# Patient Record
Sex: MALE | Race: WHITE | NOT HISPANIC OR LATINO | Employment: FULL TIME | URBAN - NONMETROPOLITAN AREA
[De-identification: names, ages, dates, MRNs, and addresses within clinical notes are randomized per-mention and may not be internally consistent; named-entity substitution may affect disease eponyms.]

---

## 2019-10-01 ENCOUNTER — HOSPITAL ENCOUNTER (EMERGENCY)
Facility: HOSPITAL | Age: 39
Discharge: STILL ADMITTED OR ADMINISTRATIVE | End: 2019-10-01
Attending: EMERGENCY MEDICINE

## 2019-10-01 ENCOUNTER — HOSPITAL ENCOUNTER (INPATIENT)
Facility: HOSPITAL | Age: 39
LOS: 6 days | Discharge: HOME | End: 2019-10-07
Attending: PSYCHIATRY & NEUROLOGY | Admitting: PSYCHIATRY & NEUROLOGY

## 2019-10-01 VITALS
HEIGHT: 67 IN | HEART RATE: 72 BPM | RESPIRATION RATE: 18 BRPM | DIASTOLIC BLOOD PRESSURE: 88 MMHG | WEIGHT: 160 LBS | OXYGEN SATURATION: 98 % | TEMPERATURE: 99.5 F | BODY MASS INDEX: 25.11 KG/M2 | SYSTOLIC BLOOD PRESSURE: 145 MMHG

## 2019-10-01 DIAGNOSIS — F10.29 ALCOHOL DEPENDENCE WITH UNSPECIFIED ALCOHOL-INDUCED DISORDER (HCC): ICD-10-CM

## 2019-10-01 DIAGNOSIS — F19.10 POLYSUBSTANCE ABUSE (HCC): Primary | ICD-10-CM

## 2019-10-01 LAB
6-ACETYL MORPHINE: NEGATIVE
ALBUMIN SERPL-MCNC: 4.9 G/DL (ref 3.5–5.2)
ALBUMIN/GLOB SERPL: 1.6 G/DL
ALP SERPL-CCNC: 92 U/L (ref 39–117)
ALT SERPL W P-5'-P-CCNC: 30 U/L (ref 1–41)
AMPHET+METHAMPHET UR QL: NEGATIVE
ANION GAP SERPL CALCULATED.3IONS-SCNC: 12 MMOL/L (ref 5–15)
AST SERPL-CCNC: 30 U/L (ref 1–40)
BARBITURATES UR QL SCN: NEGATIVE
BASOPHILS # BLD AUTO: 0.01 10*3/MM3 (ref 0–0.2)
BASOPHILS NFR BLD AUTO: 0.2 % (ref 0–1.5)
BENZODIAZ UR QL SCN: NEGATIVE
BILIRUB SERPL-MCNC: 0.8 MG/DL (ref 0.2–1.2)
BILIRUB UR QL STRIP: NEGATIVE
BUN BLD-MCNC: 18 MG/DL (ref 6–20)
BUN/CREAT SERPL: 15 (ref 7–25)
BUPRENORPHINE SERPL-MCNC: NEGATIVE NG/ML
CALCIUM SPEC-SCNC: 9.3 MG/DL (ref 8.6–10.5)
CANNABINOIDS SERPL QL: NEGATIVE
CHLORIDE SERPL-SCNC: 100 MMOL/L (ref 98–107)
CLARITY UR: ABNORMAL
CO2 SERPL-SCNC: 26 MMOL/L (ref 22–29)
COCAINE UR QL: NEGATIVE
COLOR UR: YELLOW
CREAT BLD-MCNC: 1.2 MG/DL (ref 0.76–1.27)
DEPRECATED RDW RBC AUTO: 43.6 FL (ref 37–54)
EOSINOPHIL # BLD AUTO: 0.2 10*3/MM3 (ref 0–0.4)
EOSINOPHIL NFR BLD AUTO: 3.4 % (ref 0.3–6.2)
ERYTHROCYTE [DISTWIDTH] IN BLOOD BY AUTOMATED COUNT: 13.1 % (ref 12.3–15.4)
ETHANOL BLD-MCNC: <10 MG/DL (ref 0–10)
ETHANOL UR QL: <0.01 %
GFR SERPL CREATININE-BSD FRML MDRD: 67 ML/MIN/1.73
GLOBULIN UR ELPH-MCNC: 3 GM/DL
GLUCOSE BLD-MCNC: 91 MG/DL (ref 65–99)
GLUCOSE UR STRIP-MCNC: NEGATIVE MG/DL
HCT VFR BLD AUTO: 44.3 % (ref 37.5–51)
HGB BLD-MCNC: 14.9 G/DL (ref 13–17.7)
HGB UR QL STRIP.AUTO: NEGATIVE
IMM GRANULOCYTES # BLD AUTO: 0.02 10*3/MM3 (ref 0–0.05)
IMM GRANULOCYTES NFR BLD AUTO: 0.3 % (ref 0–0.5)
KETONES UR QL STRIP: NEGATIVE
LEUKOCYTE ESTERASE UR QL STRIP.AUTO: NEGATIVE
LYMPHOCYTES # BLD AUTO: 1.67 10*3/MM3 (ref 0.7–3.1)
LYMPHOCYTES NFR BLD AUTO: 28.7 % (ref 19.6–45.3)
MAGNESIUM SERPL-MCNC: 2.3 MG/DL (ref 1.6–2.6)
MCH RBC QN AUTO: 31.6 PG (ref 26.6–33)
MCHC RBC AUTO-ENTMCNC: 33.6 G/DL (ref 31.5–35.7)
MCV RBC AUTO: 94.1 FL (ref 79–97)
METHADONE UR QL SCN: NEGATIVE
MONOCYTES # BLD AUTO: 0.65 10*3/MM3 (ref 0.1–0.9)
MONOCYTES NFR BLD AUTO: 11.2 % (ref 5–12)
NEUTROPHILS # BLD AUTO: 3.27 10*3/MM3 (ref 1.7–7)
NEUTROPHILS NFR BLD AUTO: 56.2 % (ref 42.7–76)
NITRITE UR QL STRIP: NEGATIVE
OPIATES UR QL: POSITIVE
OXYCODONE UR QL SCN: NEGATIVE
PCP UR QL SCN: NEGATIVE
PH UR STRIP.AUTO: 8 [PH] (ref 5–8)
PLATELET # BLD AUTO: 202 10*3/MM3 (ref 140–450)
PMV BLD AUTO: 9.3 FL (ref 6–12)
POTASSIUM BLD-SCNC: 3.8 MMOL/L (ref 3.5–5.2)
PROT SERPL-MCNC: 7.9 G/DL (ref 6–8.5)
PROT UR QL STRIP: ABNORMAL
RBC # BLD AUTO: 4.71 10*6/MM3 (ref 4.14–5.8)
SODIUM BLD-SCNC: 138 MMOL/L (ref 136–145)
SP GR UR STRIP: 1.02 (ref 1–1.03)
UROBILINOGEN UR QL STRIP: ABNORMAL
WBC NRBC COR # BLD: 5.82 10*3/MM3 (ref 3.4–10.8)

## 2019-10-01 PROCEDURE — 85025 COMPLETE CBC W/AUTO DIFF WBC: CPT | Performed by: NURSE PRACTITIONER

## 2019-10-01 PROCEDURE — 80307 DRUG TEST PRSMV CHEM ANLYZR: CPT | Performed by: NURSE PRACTITIONER

## 2019-10-01 PROCEDURE — 99285 EMERGENCY DEPT VISIT HI MDM: CPT

## 2019-10-01 PROCEDURE — 83735 ASSAY OF MAGNESIUM: CPT | Performed by: NURSE PRACTITIONER

## 2019-10-01 PROCEDURE — 81003 URINALYSIS AUTO W/O SCOPE: CPT | Performed by: NURSE PRACTITIONER

## 2019-10-01 PROCEDURE — 99284 EMERGENCY DEPT VISIT MOD MDM: CPT

## 2019-10-01 PROCEDURE — HZ2ZZZZ DETOXIFICATION SERVICES FOR SUBSTANCE ABUSE TREATMENT: ICD-10-PCS | Performed by: PSYCHIATRY & NEUROLOGY

## 2019-10-01 PROCEDURE — 80053 COMPREHEN METABOLIC PANEL: CPT | Performed by: NURSE PRACTITIONER

## 2019-10-01 PROCEDURE — 36415 COLL VENOUS BLD VENIPUNCTURE: CPT

## 2019-10-01 RX ORDER — LORAZEPAM 2 MG/1
2 TABLET ORAL ONCE
Status: COMPLETED | OUTPATIENT
Start: 2019-10-01 | End: 2019-10-01

## 2019-10-01 RX ADMIN — LORAZEPAM 2 MG: 2 TABLET ORAL at 22:51

## 2019-10-02 PROBLEM — F11.10: Status: ACTIVE | Noted: 2019-10-02

## 2019-10-02 PROBLEM — F10.239 ALCOHOL DEPENDENCE WITH WITHDRAWAL (HCC): Status: ACTIVE | Noted: 2019-10-02

## 2019-10-02 PROBLEM — F10.20 ALCOHOL DEPENDENCE (HCC): Status: ACTIVE | Noted: 2019-10-02

## 2019-10-02 PROBLEM — F17.200 TOBACCO USE DISORDER: Status: ACTIVE | Noted: 2019-10-02

## 2019-10-02 PROCEDURE — 99222 1ST HOSP IP/OBS MODERATE 55: CPT | Performed by: PSYCHIATRY & NEUROLOGY

## 2019-10-02 PROCEDURE — 25010000002 INFLUENZA VAC SUBUNIT QUAD 0.5 ML SUSPENSION PREFILLED SYRINGE: Performed by: PSYCHIATRY & NEUROLOGY

## 2019-10-02 PROCEDURE — 90674 CCIIV4 VAC NO PRSV 0.5 ML IM: CPT | Performed by: PSYCHIATRY & NEUROLOGY

## 2019-10-02 PROCEDURE — G0008 ADMIN INFLUENZA VIRUS VAC: HCPCS | Performed by: PSYCHIATRY & NEUROLOGY

## 2019-10-02 PROCEDURE — 93005 ELECTROCARDIOGRAM TRACING: CPT | Performed by: PSYCHIATRY & NEUROLOGY

## 2019-10-02 RX ORDER — LORAZEPAM 1 MG/1
1 TABLET ORAL
Status: COMPLETED | OUTPATIENT
Start: 2019-10-04 | End: 2019-10-04

## 2019-10-02 RX ORDER — BENZTROPINE MESYLATE 1 MG/1
2 TABLET ORAL ONCE AS NEEDED
Status: DISCONTINUED | OUTPATIENT
Start: 2019-10-02 | End: 2019-10-07 | Stop reason: HOSPADM

## 2019-10-02 RX ORDER — VITAMIN C
2 TAB ORAL DAILY
Status: DISCONTINUED | OUTPATIENT
Start: 2019-10-02 | End: 2019-10-07 | Stop reason: HOSPADM

## 2019-10-02 RX ORDER — BENZONATATE 100 MG/1
100 CAPSULE ORAL 3 TIMES DAILY PRN
Status: DISCONTINUED | OUTPATIENT
Start: 2019-10-02 | End: 2019-10-07 | Stop reason: HOSPADM

## 2019-10-02 RX ORDER — TRAZODONE HYDROCHLORIDE 50 MG/1
50 TABLET ORAL NIGHTLY PRN
Status: DISCONTINUED | OUTPATIENT
Start: 2019-10-02 | End: 2019-10-07 | Stop reason: HOSPADM

## 2019-10-02 RX ORDER — LORAZEPAM 0.5 MG/1
0.5 TABLET ORAL EVERY 4 HOURS PRN
Status: ACTIVE | OUTPATIENT
Start: 2019-10-05 | End: 2019-10-06

## 2019-10-02 RX ORDER — LORAZEPAM 2 MG/1
2 TABLET ORAL
Status: ACTIVE | OUTPATIENT
Start: 2019-10-02 | End: 2019-10-03

## 2019-10-02 RX ORDER — THIAMINE MONONITRATE (VIT B1) 100 MG
100 TABLET ORAL DAILY
Status: DISCONTINUED | OUTPATIENT
Start: 2019-10-02 | End: 2019-10-07 | Stop reason: HOSPADM

## 2019-10-02 RX ORDER — BENZTROPINE MESYLATE 1 MG/ML
1 INJECTION INTRAMUSCULAR; INTRAVENOUS ONCE AS NEEDED
Status: DISCONTINUED | OUTPATIENT
Start: 2019-10-02 | End: 2019-10-07 | Stop reason: HOSPADM

## 2019-10-02 RX ORDER — HYDROXYZINE 50 MG/1
50 TABLET, FILM COATED ORAL EVERY 6 HOURS PRN
Status: DISCONTINUED | OUTPATIENT
Start: 2019-10-02 | End: 2019-10-07 | Stop reason: HOSPADM

## 2019-10-02 RX ORDER — ALUMINA, MAGNESIA, AND SIMETHICONE 2400; 2400; 240 MG/30ML; MG/30ML; MG/30ML
15 SUSPENSION ORAL EVERY 6 HOURS PRN
Status: DISCONTINUED | OUTPATIENT
Start: 2019-10-02 | End: 2019-10-07 | Stop reason: HOSPADM

## 2019-10-02 RX ORDER — LORAZEPAM 1 MG/1
1 TABLET ORAL EVERY 4 HOURS PRN
Status: ACTIVE | OUTPATIENT
Start: 2019-10-04 | End: 2019-10-05

## 2019-10-02 RX ORDER — LORAZEPAM 0.5 MG/1
0.5 TABLET ORAL
Status: COMPLETED | OUTPATIENT
Start: 2019-10-05 | End: 2019-10-05

## 2019-10-02 RX ORDER — FAMOTIDINE 20 MG/1
20 TABLET, FILM COATED ORAL 2 TIMES DAILY PRN
Status: DISCONTINUED | OUTPATIENT
Start: 2019-10-02 | End: 2019-10-07 | Stop reason: HOSPADM

## 2019-10-02 RX ORDER — DEXTROAMPHETAMINE SACCHARATE, AMPHETAMINE ASPARTATE, DEXTROAMPHETAMINE SULFATE AND AMPHETAMINE SULFATE 1.25; 1.25; 1.25; 1.25 MG/1; MG/1; MG/1; MG/1
20 TABLET ORAL 3 TIMES DAILY
Status: CANCELLED | OUTPATIENT
Start: 2019-10-02

## 2019-10-02 RX ORDER — IBUPROFEN 400 MG/1
400 TABLET ORAL EVERY 6 HOURS PRN
Status: DISCONTINUED | OUTPATIENT
Start: 2019-10-02 | End: 2019-10-07 | Stop reason: HOSPADM

## 2019-10-02 RX ORDER — ECHINACEA PURPUREA EXTRACT 125 MG
2 TABLET ORAL AS NEEDED
Status: DISCONTINUED | OUTPATIENT
Start: 2019-10-02 | End: 2019-10-07 | Stop reason: HOSPADM

## 2019-10-02 RX ORDER — DEXTROAMPHETAMINE SACCHARATE, AMPHETAMINE ASPARTATE, DEXTROAMPHETAMINE SULFATE AND AMPHETAMINE SULFATE 5; 5; 5; 5 MG/1; MG/1; MG/1; MG/1
20 TABLET ORAL 3 TIMES DAILY
COMMUNITY
End: 2019-10-07 | Stop reason: HOSPADM

## 2019-10-02 RX ORDER — ONDANSETRON 4 MG/1
4 TABLET, FILM COATED ORAL EVERY 6 HOURS PRN
Status: DISCONTINUED | OUTPATIENT
Start: 2019-10-02 | End: 2019-10-07 | Stop reason: HOSPADM

## 2019-10-02 RX ORDER — ACETAMINOPHEN 325 MG/1
650 TABLET ORAL EVERY 6 HOURS PRN
Status: DISCONTINUED | OUTPATIENT
Start: 2019-10-02 | End: 2019-10-07 | Stop reason: HOSPADM

## 2019-10-02 RX ORDER — LORAZEPAM 2 MG/1
2 TABLET ORAL EVERY 4 HOURS PRN
Status: ACTIVE | OUTPATIENT
Start: 2019-10-02 | End: 2019-10-03

## 2019-10-02 RX ORDER — LORAZEPAM 2 MG/1
2 TABLET ORAL
Status: COMPLETED | OUTPATIENT
Start: 2019-10-02 | End: 2019-10-02

## 2019-10-02 RX ORDER — LOPERAMIDE HYDROCHLORIDE 2 MG/1
2 CAPSULE ORAL
Status: DISCONTINUED | OUTPATIENT
Start: 2019-10-02 | End: 2019-10-07 | Stop reason: HOSPADM

## 2019-10-02 RX ADMIN — VITAMIN C 2 TABLET: TAB at 08:19

## 2019-10-02 RX ADMIN — Medication 100 MG: at 08:19

## 2019-10-02 RX ADMIN — LORAZEPAM 2 MG: 2 TABLET ORAL at 15:25

## 2019-10-02 RX ADMIN — LORAZEPAM 2 MG: 2 TABLET ORAL at 21:11

## 2019-10-02 RX ADMIN — TRAZODONE HYDROCHLORIDE 50 MG: 50 TABLET ORAL at 21:11

## 2019-10-02 RX ADMIN — LORAZEPAM 2 MG: 2 TABLET ORAL at 08:19

## 2019-10-02 RX ADMIN — INFLUENZA A VIRUS A/SINGAPORE/GP1908/2015 IVR-180 (H1N1) ANTIGEN (MDCK CELL DERIVED, PROPIOLACTONE INACTIVATED), INFLUENZA A VIRUS A/NORTH CAROLINA/04/2016 (H3N2) HEMAGGLUTININ ANTIGEN (MDCK CELL DERIVED, PROPIOLACTONE INACTIVATED), INFLUENZA B VIRUS B/IOWA/06/2017 HEMAGGLUTININ ANTIGEN (MDCK CELL DERIVED, PROPIOLACTONE INACTIVATED), INFLUENZA B VIRUS B/SINGAPORE/INFTT-16-0610/2016 HEMAGGLUTININ ANTIGEN (MDCK CELL DERIVED, PROPIOLACTONE INACTIVATED) 0.5 ML: 15; 15; 15; 15 INJECTION, SUSPENSION INTRAMUSCULAR at 21:11

## 2019-10-02 NOTE — ED PROVIDER NOTES
Subjective   The patient presents to ED for detox from alcohol and pain pills. He is currently going to [facility] for rehab, however, they requested he complete a detox program prior to rehab there.         History provided by:  Patient   used: No    Addiction Problem   Severity:  Moderate  Onset quality:  Gradual  Timing:  Intermittent  Progression:  Waxing and waning  Chronicity:  Recurrent  Associated symptoms: no abdominal pain, no chest pain, no congestion, no fatigue, no fever, no rash, no rhinorrhea, no shortness of breath and no sore throat    Risk factors:  Alcoholism      Review of Systems   Constitutional: Negative.  Negative for fatigue and fever.   HENT: Negative.  Negative for congestion, rhinorrhea and sore throat.    Eyes: Negative.    Respiratory: Negative.  Negative for shortness of breath.    Cardiovascular: Negative.  Negative for chest pain.   Gastrointestinal: Negative.  Negative for abdominal pain.   Endocrine: Negative.    Genitourinary: Negative.    Musculoskeletal: Negative.    Skin: Negative.  Negative for rash.   Allergic/Immunologic: Negative.    Neurological: Negative.    Hematological: Negative.    Psychiatric/Behavioral: Positive for behavioral problems and sleep disturbance. The patient is nervous/anxious.    All other systems reviewed and are negative.      Past Medical History:   Diagnosis Date   • ADHD (attention deficit hyperactivity disorder)    • Alcoholism (CMS/HCC)        No Known Allergies    Past Surgical History:   Procedure Laterality Date   • NO PAST SURGERIES         Family History   Problem Relation Age of Onset   • Alcohol abuse Father        Social History     Socioeconomic History   • Marital status: Single     Spouse name: Not on file   • Number of children: Not on file   • Years of education: Not on file   • Highest education level: Not on file   Tobacco Use   • Smoking status: Current Every Day Smoker     Packs/day: 1.00   Substance and  Sexual Activity   • Alcohol use: Yes     Comment: see below   • Drug use: No     Comment: too k some pain pills in past 30 days, not a lot and  only on occ   • Sexual activity: Defer           Objective   Physical Exam   Constitutional: He is oriented to person, place, and time. He appears well-developed and well-nourished.   HENT:   Head: Normocephalic.   Eyes: EOM are normal. Pupils are equal, round, and reactive to light.   Neck: Normal range of motion. Neck supple.   Cardiovascular: Normal rate, regular rhythm, normal heart sounds and intact distal pulses.   Pulmonary/Chest: Effort normal and breath sounds normal.   Abdominal: Soft. Bowel sounds are normal.   Musculoskeletal: Normal range of motion.   Neurological: He is alert and oriented to person, place, and time.   Skin: Skin is warm. Capillary refill takes less than 2 seconds.   Psychiatric: His speech is normal and behavior is normal. Judgment and thought content normal. His mood appears anxious. Cognition and memory are normal.   Nursing note and vitals reviewed.      Procedures           ED Course                  MDM  Number of Diagnoses or Management Options  Alcohol dependence with unspecified alcohol-induced disorder (CMS/HCC): new and requires workup  Polysubstance abuse (CMS/HCC): new and requires workup     Amount and/or Complexity of Data Reviewed  Clinical lab tests: ordered and reviewed  Tests in the medicine section of CPT®: reviewed and ordered    Risk of Complications, Morbidity, and/or Mortality  Presenting problems: moderate  Diagnostic procedures: moderate  Management options: moderate    Patient Progress  Patient progress: improved      Final diagnoses:   Polysubstance abuse (CMS/HCC)   Alcohol dependence with unspecified alcohol-induced disorder (CMS/HCC)              Renea Ayers, APRN  10/01/19 1861

## 2019-10-02 NOTE — PLAN OF CARE
Problem: Patient Care Overview  Goal: Plan of Care Review  Outcome: Ongoing (interventions implemented as appropriate)   10/02/19 1756   Coping/Psychosocial   Plan of Care Reviewed With patient   Coping/Psychosocial   Patient Agreement with Plan of Care agrees   Plan of Care Review   Progress improving       Problem: Overarching Goals (Adult)  Goal: Adheres to Safety Considerations for Self and Others  Outcome: Ongoing (interventions implemented as appropriate)   10/02/19 1756   Overarching Goals (Adult)   Adheres to Safety Considerations for Self and Others making progress toward outcome     Goal: Optimized Coping Skills in Response to Life Stressors  Outcome: Ongoing (interventions implemented as appropriate)   10/02/19 1756   Overarching Goals (Adult)   Optimized Coping Skills in Response to Life Stressors making progress toward outcome     Goal: Develops/Participates in Therapeutic [place] to Support Successful Transition  Outcome: Ongoing (interventions implemented as appropriate)   10/02/19 1756   Overarching Goals (Adult)   Develops/Participates in Therapeutic [place] to Support Successful Transition making progress toward outcome

## 2019-10-02 NOTE — PLAN OF CARE
Problem: Patient Care Overview  Goal: Plan of Care Review  Outcome: Ongoing (interventions implemented as appropriate)   10/02/19 1451   Coping/Psychosocial   Plan of Care Reviewed With patient   Coping/Psychosocial   Patient Agreement with Plan of Care agrees   Plan of Care Review   Progress no change   Coping/Psychosocial   Consent Given to Review Plan with Patient's mother     Goal: Individualization and Mutuality  Outcome: Ongoing (interventions implemented as appropriate)   10/02/19 1419 10/02/19 1451   Individualization   Patient Specific Goals (Include Timeframe) --  Patient to report decreased withdrawal symptoms and completion of medical detox during his 3-5-day hospital stay. Patient to identify 2-3 healthy coping skills during his 3-5-day hospital stay. Patient to begin working on relapse prevention and identify stage of motivation.   Patient Specific Interventions --  Brief, CBT and motivational interviewing to address healthy coping, relapse prevention, safe disposition and motivation to change   Personal Strengths/Vulnerabilities   Patient Personal Strengths expressive of emotions;expressive of needs;resourceful --    Patient Vulnerabilities ineffective coping, alcohol dependence and withdrawal --      Goal: Discharge Needs Assessment  Outcome: Ongoing (interventions implemented as appropriate)   10/02/19 1451   Discharge Needs Assessment   Readmission Within the Last 30 Days no previous admission in last 30 days   Concerns to be Addressed coping/stress;substance/tobacco abuse/use   Patient/Family Anticipates Transition to inpatient rehabilitation facility   Patient/Family Anticipated Services at Transition rehabilitation services   Transportation Anticipated family or friend will provide;health plan transportation   Patient's Choice of Community Agency(s) To be determined   Current Discharge Risk substance use/abuse   Discharge Coordination/Progress Patient reports he has met with [employer] today to  access Medicaid and patient may need assistance with transportation.   Discharge Needs Assessment,    Outpatient/Agency/Support Group Needs outpatient counseling;outpatient medication management;outpatient psychiatric care (specify)   Anticipated Discharge Disposition home or self-care     Goal: Interprofessional Rounds/Family Conf  Outcome: Ongoing (interventions implemented as appropriate)   10/02/19 1451   Interdisciplinary Rounds/Family Conf   Summary Patient's mother to be contacted. Treatment team to discuss patient progress.   Interdisciplinary Rounds/Family Conf   Participants family;patient;social work;psychiatrist;nursing     DATA:           Therapist met individually with patient this date to introduce role and to discuss hospitalization expectations. Patient agreeable.      Clinical Maneuvering/Intervention:     Therapist assisted patient in processing above session content; acknowledged and normalized patient’s thoughts, feelings, and concerns.  Discussed the therapist/patient relationship and explain the parameters and limitations of relative confidentiality.  Also discussed the importance active participation, and honesty to the treatment process.  Encouraged the patient to discuss/vent his feelings, frustrations, and fears concerning his ongoing medical issues and validated his feelings.     Discussed the importance of finding enjoyable activities and coping skills that the patient can engage in a regular basis. Discussed healthy coping skills such as distraction, self love, grounding, thought challenges/reframing, etc. Discussed the importance of medication compliance.  Praised the patient for seeking help and spent the majority of the session building rapport.       Allowed patient to freely discuss issues without interruption or judgment. Provided safe, confidential environment to facilitate the development of positive therapeutic relationship and encourage open, honest communication.       Therapist completed integrated summary, treatment plan, and initiated social history this date.  Therapist is strongly encouraging family involvement in treatment.       ASSESSMENT:      Patient is a 39-year-old , , unemployed male residing in [facility].  Patient presents with alcohol dependence and withdrawal.  Patient reported on a [facility], however patient does not wish to return to the program there.  Patient reports he prefers to attend a facility where he can smoke and also where he can be closer to his daughter.  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  Patient reports he has struggled with his alcoholism for many years which led to divorce.  Patient reports that his wife wants an amendment to the divorce agreement and patient feels the need to discontinue his alcohol misuse in order to continue to have shared custody.  Patient also reported he is tired of being sick every day and having to drink to not be sick.  Patient is agreeable to residential treatment in [facility] that will accept Medicaid.  Patient consents to contact with his mother.     PLAN:       Patient to remain hospitalized this date.     Treatment team will focus efforts on stabilizing patient's acute symptoms while providing education on healthy coping and crisis management to reduce hospitalizations.   Patient requires daily psychiatrist evaluation and 24/7 nursing supervision to promote patient  safety.     Therapist will offer 1-4 individual sessions (20-30 minutes each), 1 therapy group daily, family education, and appropriate referral.    Therapist recommends residential treatment following stabilization.  Patient plans to attend residential and is in search of a facility in [facility] that accept Medicaid following patient getting Medicaid.

## 2019-10-02 NOTE — PLAN OF CARE
Problem: Patient Care Overview  Goal: Plan of Care Review  Outcome: Ongoing (interventions implemented as appropriate)   10/02/19 0439   Coping/Psychosocial   Plan of Care Reviewed With patient   Coping/Psychosocial   Patient Agreement with Plan of Care agrees   Plan of Care Review   Progress no change   OTHER   Outcome Summary Patient is a new admit from ED to this unit on 10/2/19 @ 0005. He had gone to long term Rehab for ETOH abuse treatment to [facility] in [place] but they told him he must have detox first and he was transported to this facility. Patient is plesant and cooperative and ate a full snack, drank adequate fluids, and went to bed to sleep after his assessment after arriving to unit. Patient brought numerous , very large suitcases and containers of belongings with him which are currently secured and labeled in the patient large bathroom on this unit. Patient is logical and motivated for treatment. He has supportive family and friends.        Problem: Overarching Goals (Adult)  Goal: Adheres to Safety Considerations for Self and Others  Outcome: Ongoing (interventions implemented as appropriate)    Goal: Optimized Coping Skills in Response to Life Stressors  Outcome: Ongoing (interventions implemented as appropriate)    Goal: Develops/Participates in Therapeutic [place] to Support Successful Transition  Outcome: Ongoing (interventions implemented as appropriate)

## 2019-10-02 NOTE — NURSING NOTE
Phone contact Dr Basilio. Assess/clinicals presented. Orders received. Admit to [facility]. Routine orders. Ativan 2 mg every 2 hrs as needed for withdrawal sx's. Orders read back and confirmed.

## 2019-10-02 NOTE — NURSING NOTE
Presented to ED from [facility] requesting ETOH detox.  Reports going to [facility] yesterday, and began to have w/d symptoms today.  Plans to return there upon discharge.  Reports that he has been drinking 1.5 pints of vodka daily since April.  Reports that he has had a problem with alcohol for the last 8 years.  Was last sober from January-March 2019.  Is currently going through a divorce, and recently quit his job.  Denies any hx of detox admissions.

## 2019-10-02 NOTE — NURSING NOTE
Pt presents to intake brought by staff member mejia gauthier, requesting alcohol detox. States he went there yesterday, had told them he was ready for rehab, and tonight he began having withdrawal sx's. Staff report he has bed there when he is d/c'd from detox. Pt states last drink 1 1/2 pints vodka yesterday; this is his daily amount since April.   Pt searched per staff, placed in hosp scrubs, belongings secured and placed in cabinet. Pt in monitored room.

## 2019-10-02 NOTE — H&P
"INITIAL PSYCHIATRIC HISTORY & PHYSICAL    Patient Identification:  Name:   Morteza Judd  Age:   39 y.o.  Sex:   male  :   1980  MRN:   [id number]  Visit Number:   [id number]  Primary Care Physician:   Provider, No Known    SUBJECTIVE    CC/Focus of Exam: alcohol abuse , opiate use    HPI: Morteza Judd is a 39 y.o. male who was admitted on 10/1/2019 with complaints of alcohol abuse. Patient presented to [facility] requesting assistance with detoxification.  Patient reports presented to [facility] prior to admit for at least one day when he began experiencing withdrawal symptoms of shakiness, anxiousness. Patient has endorsed drinking 1.5 pints of Vodka daily since 2019, last use 2 days ago. Patient reports problematic , heavy alcohol use for past 8 years.  Patient reports longest period sobriety as a couple of months. He reports last period of sobriety 2019-2019 . Patient identifies several negative consequences of use including financial, relationship issues. Patient reports currently going through a divorce, partial as result of use. He has one 5 year old child whom he shares custody. Patient holds a Bachelors' Degree, worked in the past as  . 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 For the past three weeks he's been living at his Mother's home.  He reports being currently prescribed Adderall by KEL[facility] Ramos in [place], [place], he endorses non compliance with medication, states no medication for 2 weeks. UDS is positive for Opiate. Patient reports using Opiate at time for pain, Vicodin 1-1.5 tabs \"randomly\". Stating will take a day or two then none for a week. He denies history of seizure. He denies suicidal or homicidal ideation He denies hallucination. Patient says he plans to go to long term treatment at discharge, possibly returning to [facility]. Noted CIWA score of 16 upon intake.  He was admitted to the Detox Recovery Unit " for safety and further stabilization.         Available medical/psychiatric records reviewed and incorporated into the current document.     PAST PSYCHIATRIC HX:  Patient denies previous inpatient or psychiatric  or detoxification treatment . He denies previous abuse. No know previous self injurious behavior or suicidal attempts.     SUBSTANCE USE HX:  UDS is positive for Opiate. See hpi for current use.     SOCIAL HX:  Patient was born and raised in [place] [place] with Parents, reports Father was an alcoholic, chaotic at times. Denies abuse. He is college educated and has worked in the past as addressed in HPI. He denies legal issues.  Reports he was raised [employer] states would like to start attending a [employer].     Past Medical History:   Diagnosis Date   • ADHD (attention deficit hyperactivity disorder)    • Alcoholism (CMS/HCC)        Past Surgical History:   Procedure Laterality Date   • NO PAST SURGERIES         Family History   Problem Relation Age of Onset   • Alcohol abuse Father          Medications Prior to Admission   Medication Sig Dispense Refill Last Dose   • amphetamine-dextroamphetamine (ADDERALL) 20 MG tablet Take 20 mg by mouth 3 (Three) Times a Day.   Past Month at Unknown time           ALLERGIES:  Patient has no known allergies.    Temp:  [97.8 °F (36.6 °C)-99.5 °F (37.5 °C)] 98.2 °F (36.8 °C)  Heart Rate:  [68-85] 68  Resp:  [18] 18  BP: (114-159)/() 145/91    REVIEW OF SYSTEMS:  Constitutional: Negative.    HENT: Negative.    Eyes: Negative.    Respiratory: Negative.    Cardiovascular: Negative.    Gastrointestinal: Negative.    Endocrine: Negative.    Genitourinary: Negative.    Musculoskeletal: Negative.    Skin: Negative.    Allergic/Immunologic: Negative.    Neurological: Negative.    Hematological: Negative.    Psychiatric/Behavioral: The patient is nervous/anxious.      OBJECTIVE    PHYSICAL EXAM:  Physical Exam  Constitutional: oriented to person, place, and time.  Appears well-developed and well-nourished.   HENT:   Head: Normocephalic and atraumatic.   Right Ear: External ear normal.   Left Ear: External ear normal.   Mouth/Throat: Oropharynx is clear and moist.   Eyes: Pupils are equal, round, and reactive to light. Conjunctivae and EOM are normal.   Neck: Normal range of motion. Neck supple.   Cardiovascular: Normal rate, regular rhythm and normal heart sounds.    Pulmonary/Chest: Effort normal and breath sounds normal. No respiratory distress. No wheezes.   Abdominal: Soft. Bowel sounds are normal.No distension. There is no tenderness.   Musculoskeletal: Normal range of motion. No edema or deformity.   Neurological:Alert and oriented to person, place, and time. No cranial nerve deficit. Coordination normal.   Skin: Skin is warm and dry. No rash noted. No erythema.     MENTAL STATUS EXAM:    Hygiene:   fair  Cooperation:  Cooperative  Eye Contact:  Fair  Psychomotor Behavior:  Restless  Affect:  Appropriate  Hopelessness: Denies  Speech:  Normal  Thought Progress:  Linear  Thought Content:  Mood congruent  Suicidal:  None  Homicidal:  None  Hallucinations:  None  Delusion no delusional content note   Memory: intact   Orientation: person, place , time and situation   Reliability: fair   Insight:  Fair   Judgement:  Poor   Impulse Control: poor   Physical/Medical Issues: see medical list       Imaging Results (last 24 hours)     ** No results found for the last 24 hours. **           ECG/EMG Results (most recent)     Procedure Component Value Units Date/Time    ECG 12 Lead [[id number]] Collected:  10/02/19 0133     Updated:  10/02/19 1358    Narrative:       Test Reason : Baseline Cardiac Status  Blood Pressure : **/** mmHG  Vent. Rate : 076 BPM     Atrial Rate : 076 BPM     P-R Int : 136 ms          QRS Dur : 098 ms      QT Int : 446 ms       P-R-T Axes : 051 050 049 degrees     QTc Int : 501 ms    Normal sinus rhythm with sinus arrhythmia  Nonspecific T wave  abnormality  Abnormal ECG    Confirmed by Barbara Hernandez ([id number]) on 10/2/2019 1:58:36 PM    Referred By:  FELIX           Confirmed By:Barbara Hernandez           Lab Results   Component Value Date    GLUCOSE 91 10/01/2019    BUN 18 10/01/2019    CREATININE 1.20 10/01/2019    EGFRIFNONA 67 10/01/2019    BCR 15.0 10/01/2019    CO2 26.0 10/01/2019    CALCIUM 9.3 10/01/2019    ALBUMIN 4.90 10/01/2019    AST 30 10/01/2019    ALT 30 10/01/2019       Lab Results   Component Value Date    WBC 5.82 10/01/2019    HGB 14.9 10/01/2019    HCT 44.3 10/01/2019    MCV 94.1 10/01/2019     10/01/2019       Pain Management Panel     Pain Management Panel Latest Ref Rng & Units 10/1/2019    AMPHETAMINES SCREEN, URINE Negative Negative    BARBITURATES SCREEN Negative Negative    BENZODIAZEPINE SCREEN, URINE Negative Negative    BUPRENORPHINEUR Negative Negative    COCAINE SCREEN, URINE Negative Negative    METHADONE SCREEN, URINE Negative Negative          Brief Urine Lab Results  (Last result in the past 365 days)      Color   Clarity   Blood   Leuk Est   Nitrite   Protein   CREAT   Urine HCG        10/01/19 2154 Yellow Turbid Negative Negative Negative Trace               Reviewed labs and studies done with this admission.       ASSESSMENT & PLAN:       Alcohol dependence with withdrawal (CMS/HCC)  - Ativan detox  - Individual and group psychotherapy  - Introduction to 12 step treatment model       Tobacco use disorder  - Encouraged patient to quit smoking.       Hydrocodone use disorder, mild (CMS/HCC)  - Pt report occasional use, and denies cravings or withdrawals, continue to monitor         The patient has been admitted for safety and stabilization.  Patient will be monitored for suicidality daily and maintained on 30 minute rounds for safety .  The patient will have individual and group therapy with a master's level therapist. A master treatment plan will be developed and agreed upon by the patient and his/her treatment  team.  The patient's estimated length of stay in the hospital is 5-7 days.       Written by Dhara Braswell RN, acting as scribe for Dr.M Worley . Dr. AUSTIN Worley 's signature on this note affirms that the note adequately documents the care provided.     Dhara Braswell RN  10/02/19  2:26 PM      IAureliano MD, personally performed the services described in this documentation as scribed by the above named individual in my presence, and it is both accurate and complete.

## 2019-10-03 PROCEDURE — 99232 SBSQ HOSP IP/OBS MODERATE 35: CPT | Performed by: PSYCHIATRY & NEUROLOGY

## 2019-10-03 RX ORDER — VARENICLINE TARTRATE 0.5 MG/1
0.5 TABLET, FILM COATED ORAL 2 TIMES DAILY WITH MEALS
Status: DISCONTINUED | OUTPATIENT
Start: 2019-10-06 | End: 2019-10-07 | Stop reason: HOSPADM

## 2019-10-03 RX ORDER — VARENICLINE TARTRATE 0.5 MG/1
0.5 TABLET, FILM COATED ORAL DAILY
Status: COMPLETED | OUTPATIENT
Start: 2019-10-03 | End: 2019-10-05

## 2019-10-03 RX ADMIN — VARENICLINE TARTRATE 0.5 MG: 0.5 TABLET, FILM COATED ORAL at 15:45

## 2019-10-03 RX ADMIN — VITAMIN C 2 TABLET: TAB at 08:43

## 2019-10-03 RX ADMIN — IBUPROFEN 400 MG: 400 TABLET, FILM COATED ORAL at 20:58

## 2019-10-03 RX ADMIN — TRAZODONE HYDROCHLORIDE 50 MG: 50 TABLET ORAL at 20:51

## 2019-10-03 RX ADMIN — LORAZEPAM 1.5 MG: 1 TABLET ORAL at 21:00

## 2019-10-03 RX ADMIN — Medication 100 MG: at 08:42

## 2019-10-03 RX ADMIN — LORAZEPAM 1.5 MG: 1 TABLET ORAL at 14:58

## 2019-10-03 RX ADMIN — LORAZEPAM 1.5 MG: 1 TABLET ORAL at 08:42

## 2019-10-03 RX ADMIN — HYDROXYZINE HYDROCHLORIDE 50 MG: 50 TABLET ORAL at 20:51

## 2019-10-03 NOTE — PLAN OF CARE
Problem: Patient Care Overview  Goal: Plan of Care Review  Outcome: Ongoing (interventions implemented as appropriate)   10/03/19 3738   Coping/Psychosocial   Plan of Care Reviewed With patient   Coping/Psychosocial   Patient Agreement with Plan of Care agrees   Plan of Care Review   Progress improving   OTHER   Outcome Summary Patient out of room for meals/smoke breaks. Anxiety 4, depression 5, denies S/I, H/I & A/V/H ideations. Slight tremors noted, denies cravings, slight tremors noted.  Is very hyperverbal, rambling at times, making turkey calls because he got started on chantix.       Problem: Overarching Goals (Adult)  Goal: Adheres to Safety Considerations for Self and Others  Outcome: Ongoing (interventions implemented as appropriate)    Goal: Optimized Coping Skills in Response to Life Stressors  Outcome: Ongoing (interventions implemented as appropriate)    Goal: Develops/Participates in Therapeutic [place] to Support Successful Transition  Outcome: Ongoing (interventions implemented as appropriate)

## 2019-10-03 NOTE — PROGRESS NOTES
Subjective   Morteza Judd is a 39 y.o. male who presents today for hospital follow up    Chief Complaint:  Alcohol Dependence     History of Present Illness: Patient is a 38yo CM admitted for alcohol detox. He has been placed on clonidine withdrawal protocol and is so far tolerating this well. He reports he gets some chills and is somewhat tremulous but denies other withdrawal symptoms. He requests to be placed back on his Adderall and also requests help with smoking cessation in the form of Chantix. We will start Chantix but hold off on Adderall currently as he reports selling his last prescription and Adderall could potentially mask some withdrawal symptoms and portray others. I think it would be best to see patient detox off of alcohol and not have a stimulant on board to see what symptoms we see. I advised the changes his mother and he are worried about as far as personality and behavior could also be due to alcohol detox and this can be resumed at a later time. He denies SI/HI/AVH.     The following portions of the patient's history were reviewed and updated as appropriate: allergies, current medications, past family history, past medical history, past social history, past surgical history and problem list.      Past Medical History:  Past Medical History:   Diagnosis Date   • ADHD (attention deficit hyperactivity disorder)    • Alcoholism (CMS/[facility])        Social History:  Social History     Socioeconomic History   • Marital status: Single     Spouse name: Not on file   • Number of children: Not on file   • Years of education: Not on file   • Highest education level: Not on file   Tobacco Use   • Smoking status: Current Every Day Smoker     Packs/day: 1.00   • Smokeless tobacco: Never Used   Substance and Sexual Activity   • Alcohol use: Yes     Comment: see below   • Drug use: No     Comment: too k some pain pills in past 30 days, not a lot and  only on occ   • Sexual activity: Defer       Family  History:  Family History   Problem Relation Age of Onset   • Alcohol abuse Father        Past Surgical History:  Past Surgical History:   Procedure Laterality Date   • NO PAST SURGERIES         Problem List:  Patient Active Problem List   Diagnosis   • Alcohol dependence with withdrawal (CMS/HCC)   • Tobacco use disorder   • Hydrocodone use disorder, mild (CMS/HCC)       Allergy:   No Known Allergies     Current Medications:   Current Facility-Administered Medications   Medication Dose Route Frequency Provider Last Rate Last Dose   • acetaminophen (TYLENOL) tablet 650 mg  650 mg Oral Q6H PRN Kendall Basilio MD       • aluminum-magnesium hydroxide-simethicone (MAALOX MAX) 400-400-40 MG/5ML suspension 15 mL  15 mL Oral Q6H PRN Kendall Basilio MD       • benzonatate (TESSALON) capsule 100 mg  100 mg Oral TID PRN Kendall Basilio MD       • benztropine (COGENTIN) tablet 2 mg  2 mg Oral Once PRN Kendall Basilio MD        Or   • benztropine (COGENTIN) injection 1 mg  1 mg Intramuscular Once PRN Kendall Basilio MD       • famotidine (PEPCID) tablet 20 mg  20 mg Oral BID PRN Kendall Basilio MD       • hydrOXYzine (ATARAX) tablet 50 mg  50 mg Oral Q6H PRN Kendall Basilio MD       • ibuprofen (ADVIL,MOTRIN) tablet 400 mg  400 mg Oral Q6H PRN Kendall Basilio MD       • loperamide (IMODIUM) capsule 2 mg  2 mg Oral Q2H PRN Kendall Basilio MD       • LORazepam (ATIVAN) tablet 1.5 mg  1.5 mg Oral 3 times per day Kendall Basilio MD   1.5 mg at 10/03/19 1458    Followed by   • [START ON 10/4/2019] LORazepam (ATIVAN) tablet 1 mg  1 mg Oral 3 times per day Kendall Basilio MD        Followed by   • [START ON 10/5/2019] LORazepam (ATIVAN) tablet 0.5 mg  0.5 mg Oral 3 times per day Kendall Basilio MD       • LORazepam (ATIVAN) tablet 1.5 mg  1.5 mg Oral Q4H PRN Kendall Basilio MD        Followed by   • [START ON 10/4/2019] LORazepam (ATIVAN) tablet 1 mg  1 mg Oral Q4H PRN Kendall Basilio MD        Followed by  "  • [START ON 10/5/2019] LORazepam (ATIVAN) tablet 0.5 mg  0.5 mg Oral Q4H PRN Kendall Basilio MD       • magnesium hydroxide (MILK OF MAGNESIA) suspension 2400 mg/10mL 10 mL  10 mL Oral Daily PRN Kendall Basilio MD       • ondansetron (ZOFRAN) tablet 4 mg  4 mg Oral Q6H PRN Kendall Basilio MD       • sodium chloride nasal spray 2 spray  2 spray Each Nare PRN Kendall Basilio MD       • thiamine (VITAMIN B-1) tablet 100 mg  100 mg Oral Daily Kendall Basilio MD   100 mg at 10/03/19 0842   • traZODone (DESYREL) tablet 50 mg  50 mg Oral Nightly PRN Kendall Basilio MD   50 mg at 10/02/19 2111   • varenicline (CHANTIX) tablet 0.5 mg  0.5 mg Oral Daily Christopher Butler MD   0.5 mg at 10/03/19 1545   • [START ON 10/6/2019] varenicline (CHANTIX) tablet 0.5 mg  0.5 mg Oral BID With Meals Christopher Butler MD       • vitamin b complex (TOTAL B/C) tablet 2 tablet  2 tablet Oral Daily Kendall Basilio MD   2 tablet at 10/03/19 0843       Review of Symptoms:    Review of Systems   Constitutional: Positive for chills. Negative for fever and unexpected weight loss.   HENT: Negative.    Eyes: Negative.    Respiratory: Negative.    Cardiovascular: Negative.    Gastrointestinal: Negative.    Endocrine: Negative.    Genitourinary: Negative.    Musculoskeletal: Negative.    Skin: Negative.    Allergic/Immunologic: Negative.    Neurological: Positive for tremors. Negative for seizures, syncope and weakness.   Hematological: Negative.    Psychiatric/Behavioral: Positive for dysphoric mood and stress. Negative for agitation, behavioral problems, decreased concentration, hallucinations, self-injury, sleep disturbance, suicidal ideas, negative for hyperactivity and depressed mood. The patient is not nervous/anxious.          Physical Exam:   Blood pressure 143/89, pulse 89, temperature 97.6 °F (36.4 °C), temperature source Temporal, resp. rate 18, height 170.2 cm (67.01\"), weight 77.7 kg (171 lb 6.4 oz), SpO2 98 " %.    Appearance:  male of stated age in no acute distress  Gait, Station, Strength: WNL    Mental Status Exam:   Hygiene:   good  Cooperation:  Cooperative  Eye Contact:  Good  Psychomotor Behavior:  Appropriate  Affect:  Full range  Mood: normal  Hopelessness: Denies  Speech:  Normal  Thought Process:  Goal directed and Linear  Thought Content:  Normal  Suicidal:  None  Homicidal:  None  Hallucinations:  None  Delusion:  None  Memory:  Intact  Orientation:  Person, Place, Time and Situation  Reliability:  fair  Insight:  Fair  Judgement:  Fair  Impulse Control:  Fair  Physical/Medical Issues:  No        Lab Results:   Admission on 10/01/2019, Discharged on 10/01/2019   Component Date Value Ref Range Status   • Glucose 10/01/2019 91  65 - 99 mg/dL Final   • BUN 10/01/2019 18  6 - 20 mg/dL Final   • Creatinine 10/01/2019 1.20  0.76 - 1.27 mg/dL Final   • Sodium 10/01/2019 138  136 - 145 mmol/L Final   • Potassium 10/01/2019 3.8  3.5 - 5.2 mmol/L Final   • Chloride 10/01/2019 100  98 - 107 mmol/L Final   • CO2 10/01/2019 26.0  22.0 - 29.0 mmol/L Final   • Calcium 10/01/2019 9.3  8.6 - 10.5 mg/dL Final   • Total Protein 10/01/2019 7.9  6.0 - 8.5 g/dL Final   • Albumin 10/01/2019 4.90  3.50 - 5.20 g/dL Final   • ALT (SGPT) 10/01/2019 30  1 - 41 U/L Final   • AST (SGOT) 10/01/2019 30  1 - 40 U/L Final   • Alkaline Phosphatase 10/01/2019 92  39 - 117 U/L Final   • Total Bilirubin 10/01/2019 0.8  0.2 - 1.2 mg/dL Final   • eGFR Non  Amer 10/01/2019 67  >60 mL/min/1.73 Final   • Globulin 10/01/2019 3.0  gm/dL Final   • A/G Ratio 10/01/2019 1.6  g/dL Final   • BUN/Creatinine Ratio 10/01/2019 15.0  7.0 - 25.0 Final   • Anion Gap 10/01/2019 12.0  5.0 - 15.0 mmol/L Final   • Color, UA 10/01/2019 Yellow  Yellow, Straw Final   • Appearance, UA 10/01/2019 Turbid* Clear Final   • pH, UA 10/01/2019 8.0  5.0 - 8.0 Final   • Specific Gravity,  10/01/2019 1.025  1.005 - 1.030 Final   • Glucose, UA 10/01/2019 Negative   Negative Final   • Ketones, UA 10/01/2019 Negative  Negative Final   • Bilirubin, UA 10/01/2019 Negative  Negative Final   • Blood, UA 10/01/2019 Negative  Negative Final   • Protein, UA 10/01/2019 Trace* Negative Final   • Leuk Esterase, UA 10/01/2019 Negative  Negative Final   • Nitrite, UA 10/01/2019 Negative  Negative Final   • Urobilinogen, UA 10/01/2019 1.0 E.U./dL  0.2 - 1.0 E.U./dL Final   • Amphetamine Screen, Urine 10/01/2019 Negative  Negative Final   • Barbiturates Screen, Urine 10/01/2019 Negative  Negative Final   • Benzodiazepine Screen, Urine 10/01/2019 Negative  Negative Final   • Cocaine Screen, Urine 10/01/2019 Negative  Negative Final   • Methadone Screen, Urine 10/01/2019 Negative  Negative Final   • Opiate Screen 10/01/2019 Positive* Negative Final   • Phencyclidine (PCP), Urine 10/01/2019 Negative  Negative Final   • THC, Screen, Urine 10/01/2019 Negative  Negative Final   • 6-ACETYL MORPHINE 10/01/2019 Negative  Negative Final   • Buprenorphine, Screen, Urine 10/01/2019 Negative  Negative Final   • Oxycodone Screen, Urine 10/01/2019 Negative  Negative Final   • Ethanol 10/01/2019 <10  0 - 10 mg/dL Final   • Ethanol % 10/01/2019 <0.010  % Final   • Magnesium 10/01/2019 2.3  1.6 - 2.6 mg/dL Final   • WBC 10/01/2019 5.82  3.40 - 10.80 10*3/mm3 Final   • RBC 10/01/2019 4.71  4.14 - 5.80 10*6/mm3 Final   • Hemoglobin 10/01/2019 14.9  13.0 - 17.7 g/dL Final   • Hematocrit 10/01/2019 44.3  37.5 - 51.0 % Final   • MCV 10/01/2019 94.1  79.0 - 97.0 fL Final   • MCH 10/01/2019 31.6  26.6 - 33.0 pg Final   • MCHC 10/01/2019 33.6  31.5 - 35.7 g/dL Final   • RDW 10/01/2019 13.1  12.3 - 15.4 % Final   • RDW-SD 10/01/2019 43.6  37.0 - 54.0 fl Final   • MPV 10/01/2019 9.3  6.0 - 12.0 fL Final   • Platelets 10/01/2019 202  140 - 450 10*3/mm3 Final   • Neutrophil % 10/01/2019 56.2  42.7 - 76.0 % Final   • Lymphocyte % 10/01/2019 28.7  19.6 - 45.3 % Final   • Monocyte % 10/01/2019 11.2  5.0 - 12.0 % Final   •  Eosinophil % 10/01/2019 3.4  0.3 - 6.2 % Final   • Basophil % 10/01/2019 0.2  0.0 - 1.5 % Final   • Immature Grans % 10/01/2019 0.3  0.0 - 0.5 % Final   • Neutrophils, Absolute 10/01/2019 3.27  1.70 - 7.00 10*3/mm3 Final   • Lymphocytes, Absolute 10/01/2019 1.67  0.70 - 3.10 10*3/mm3 Final   • Monocytes, Absolute 10/01/2019 0.65  0.10 - 0.90 10*3/mm3 Final   • Eosinophils, Absolute 10/01/2019 0.20  0.00 - 0.40 10*3/mm3 Final   • Basophils, Absolute 10/01/2019 0.01  0.00 - 0.20 10*3/mm3 Final   • Immature Grans, Absolute 10/01/2019 0.02  0.00 - 0.05 10*3/mm3 Final       Assessment/Plan    Alcohol dependence with withdrawal (CMS/HCC)  - Ativan detox  - Individual and group psychotherapy  - Introduction to 12 step treatment model       Tobacco use disorder  - Start Chantix        Hydrocodone use disorder, mild (CMS/HCC)  - Pt report occasional use, and denies cravings or withdrawals, continue to monitor    ADHD  - Hold off on Adderall currently, patient sold last prescription and is currently detoxing from alcohol. Can be restarted at a later time if deemed appropriate.            The patient has been admitted for safety and stabilization.  Patient will be monitored for suicidality daily and maintained on 30 minute rounds for safety .  The patient will have individual and group therapy with a master's level therapist. A master treatment plan will be developed and agreed upon by the patient and his/her treatment team.  The patient's estimated length of stay in the hospital is 5-7 days.       MEDS ORDERED DURING VISIT:  New Medications Ordered This Visit   Medications   • acetaminophen (TYLENOL) tablet 650 mg   • ibuprofen (ADVIL,MOTRIN) tablet 400 mg   • traZODone (DESYREL) tablet 50 mg   • hydrOXYzine (ATARAX) tablet 50 mg   • magnesium hydroxide (MILK OF MAGNESIA) suspension 2400 mg/10mL 10 mL   • loperamide (IMODIUM) capsule 2 mg   • aluminum-magnesium hydroxide-simethicone (MAALOX MAX) 400-400-40 MG/5ML suspension  15 mL   • famotidine (PEPCID) tablet 20 mg   • OR Linked Order Group    • benztropine (COGENTIN) tablet 2 mg    • benztropine (COGENTIN) injection 1 mg   • benzonatate (TESSALON) capsule 100 mg   • sodium chloride nasal spray 2 spray   • ondansetron (ZOFRAN) tablet 4 mg   • vitamin b complex (TOTAL B/C) tablet 2 tablet   • thiamine (VITAMIN B-1) tablet 100 mg   • LORazepam (ATIVAN) tablet 2 mg   • FOLLOWED BY Linked Order Group    • LORazepam (ATIVAN) tablet 2 mg    • LORazepam (ATIVAN) tablet 1.5 mg    • LORazepam (ATIVAN) tablet 1 mg    • LORazepam (ATIVAN) tablet 0.5 mg   • FOLLOWED BY Linked Order Group    • LORazepam (ATIVAN) tablet 2 mg    • LORazepam (ATIVAN) tablet 1.5 mg    • LORazepam (ATIVAN) tablet 1 mg    • LORazepam (ATIVAN) tablet 0.5 mg   • Influenza Vac Subunit Quad (FLUCELVAX) injection 0.5 mL   • varenicline (CHANTIX) tablet 0.5 mg   • varenicline (CHANTIX) tablet 0.5 mg                This document has been electronically signed by Christopher Butler MD  October 3, 2019 4:58 PM

## 2019-10-03 NOTE — PLAN OF CARE
Problem: Patient Care Overview  Goal: Plan of Care Review  Outcome: Ongoing (interventions implemented as appropriate)   10/03/19 0150   Coping/Psychosocial   Plan of Care Reviewed With patient   Coping/Psychosocial   Patient Agreement with Plan of Care agrees   Plan of Care Review   Progress improving   OTHER   Outcome Summary Patient slept all night. Reports anxiety 8, depression 7. Craving 6. WD symptoms slight tremor, headache, leg cramps, chills, nausea. Denies SI and HI. Denies hallucinations. Hyperverbal at times duriing logical but rambling conversations. Restless but cooperative. Denies SI and HI. Denies hallucinations. Did attend Group.       Problem: Overarching Goals (Adult)  Goal: Adheres to Safety Considerations for Self and Others  Outcome: Ongoing (interventions implemented as appropriate)    Goal: Optimized Coping Skills in Response to Life Stressors  Outcome: Ongoing (interventions implemented as appropriate)    Goal: Develops/Participates in Therapeutic [place] to Support Successful Transition  Outcome: Ongoing (interventions implemented as appropriate)

## 2019-10-03 NOTE — PROGRESS NOTES
"[phone]  D: Spoke with the patient individually in the day room, assessing his needs and discussing aftercare treatment plans.  Patient agreeable.  Patient requested information on residential treatment programs in [facility].  He explained he would not be returning to [facility] because it was \"not the place for me.\"  He explained further that he was not court ordered to treatment, and everyone at [facility] was court ordered.  He also wanted to go to a residential treatment program that included a gym.  He wanted the \"nicest\" treatment facility that his insurance could cover.  The patient had heard of [facility] in [place] and also had heard of [facility].  The therapist provided contact information for both programs and encouraged the patient to call.    A: The patient's affect appeared anxious.  His speech was pressured.  He seemed to have a poor degree of motivation to change, given the stipulations he was placing on the program that he wanted to pursue.    P: The patient will continue hospitalization.  He will contact [facility] and [facility] to determine if they are suitable facilities for him.  "

## 2019-10-04 PROCEDURE — 99231 SBSQ HOSP IP/OBS SF/LOW 25: CPT | Performed by: PSYCHIATRY & NEUROLOGY

## 2019-10-04 RX ADMIN — VARENICLINE TARTRATE 0.5 MG: 0.5 TABLET, FILM COATED ORAL at 08:40

## 2019-10-04 RX ADMIN — LORAZEPAM 1 MG: 1 TABLET ORAL at 08:41

## 2019-10-04 RX ADMIN — LORAZEPAM 1 MG: 1 TABLET ORAL at 14:29

## 2019-10-04 RX ADMIN — Medication 100 MG: at 08:40

## 2019-10-04 RX ADMIN — TRAZODONE HYDROCHLORIDE 50 MG: 50 TABLET ORAL at 21:08

## 2019-10-04 RX ADMIN — VITAMIN C 2 TABLET: TAB at 08:39

## 2019-10-04 RX ADMIN — LORAZEPAM 1 MG: 1 TABLET ORAL at 21:08

## 2019-10-04 RX ADMIN — HYDROXYZINE HYDROCHLORIDE 50 MG: 50 TABLET ORAL at 21:08

## 2019-10-04 NOTE — PROGRESS NOTES
Subjective   Morteza Judd is a 39 y.o. male who presents today for hospital follow up    Chief Complaint:  Alcohol Dependence     History of Present Illness: Patient is a 40yo CM admitted for alcohol detox.  Patient reports he is tolerating detox well.  He denies any major symptoms today.  He feels that the medications are adequately controlling his symptoms.  He is tolerating Chantix without side effects.  He denies cravings.  He denies SI/HI/AVH.     The following portions of the patient's history were reviewed and updated as appropriate: allergies, current medications, past family history, past medical history, past social history, past surgical history and problem list.      Past Medical History:  Past Medical History:   Diagnosis Date   • ADHD (attention deficit hyperactivity disorder)    • Alcoholism (CMS/HCC)        Social History:  Social History     Socioeconomic History   • Marital status: Single     Spouse name: Not on file   • Number of children: Not on file   • Years of education: Not on file   • Highest education level: Not on file   Tobacco Use   • Smoking status: Current Every Day Smoker     Packs/day: 1.00   • Smokeless tobacco: Never Used   Substance and Sexual Activity   • Alcohol use: Yes     Comment: see below   • Drug use: No     Comment: too k some pain pills in past 30 days, not a lot and  only on occ   • Sexual activity: Defer       Family History:  Family History   Problem Relation Age of Onset   • Alcohol abuse Father        Past Surgical History:  Past Surgical History:   Procedure Laterality Date   • NO PAST SURGERIES         Problem List:  Patient Active Problem List   Diagnosis   • Alcohol dependence with withdrawal (CMS/HCC)   • Tobacco use disorder   • Hydrocodone use disorder, mild (CMS/HCC)       Allergy:   No Known Allergies     Current Medications:   Current Facility-Administered Medications   Medication Dose Route Frequency Provider Last Rate Last Dose   • acetaminophen  (TYLENOL) tablet 650 mg  650 mg Oral Q6H PRN Kendall Basilio MD       • aluminum-magnesium hydroxide-simethicone (MAALOX MAX) 400-400-40 MG/5ML suspension 15 mL  15 mL Oral Q6H PRN Kenadll Basilio MD       • benzonatate (TESSALON) capsule 100 mg  100 mg Oral TID PRN Kendall Basilio MD       • benztropine (COGENTIN) tablet 2 mg  2 mg Oral Once PRN Kendall Basilio MD        Or   • benztropine (COGENTIN) injection 1 mg  1 mg Intramuscular Once PRN Kendall Basilio MD       • famotidine (PEPCID) tablet 20 mg  20 mg Oral BID PRN Kendall Basilio MD       • hydrOXYzine (ATARAX) tablet 50 mg  50 mg Oral Q6H PRN Kendall Basilio MD   50 mg at 10/03/19 2051   • ibuprofen (ADVIL,MOTRIN) tablet 400 mg  400 mg Oral Q6H PRN Kendall Basilio MD   400 mg at 10/03/19 2058   • loperamide (IMODIUM) capsule 2 mg  2 mg Oral Q2H PRN Kendall Basilio MD       • LORazepam (ATIVAN) tablet 1 mg  1 mg Oral 3 times per day Kendall Basilio MD   1 mg at 10/04/19 0841    Followed by   • [START ON 10/5/2019] LORazepam (ATIVAN) tablet 0.5 mg  0.5 mg Oral 3 times per day Kendall Basilio MD       • LORazepam (ATIVAN) tablet 1 mg  1 mg Oral Q4H PRN Kendall Basilio MD        Followed by   • [START ON 10/5/2019] LORazepam (ATIVAN) tablet 0.5 mg  0.5 mg Oral Q4H PRN Kendall Basilio MD       • magnesium hydroxide (MILK OF MAGNESIA) suspension 2400 mg/10mL 10 mL  10 mL Oral Daily PRN Kendall Basilio MD       • ondansetron (ZOFRAN) tablet 4 mg  4 mg Oral Q6H PRN Kendall Basilio MD       • sodium chloride nasal spray 2 spray  2 spray Each Nare PRN Kendall Basilio MD       • thiamine (VITAMIN B-1) tablet 100 mg  100 mg Oral Daily Kendall Basilio MD   100 mg at 10/04/19 0840   • traZODone (DESYREL) tablet 50 mg  50 mg Oral Nightly PRN Kendall Basilio MD   50 mg at 10/03/19 2051   • varenicline (CHANTIX) tablet 0.5 mg  0.5 mg Oral Daily Christopher Butler MD   0.5 mg at 10/04/19 0840   • [START ON 10/6/2019] varenicline (CHANTIX)  "tablet 0.5 mg  0.5 mg Oral BID With Meals Christopher Butler MD       • vitamin b complex (TOTAL B/C) tablet 2 tablet  2 tablet Oral Daily Kendall Basilio MD   2 tablet at 10/04/19 0839       Review of Symptoms:    Review of Systems   Constitutional: Negative for chills, fever and unexpected weight loss.   HENT: Negative.    Eyes: Negative.    Respiratory: Negative.    Cardiovascular: Negative.    Gastrointestinal: Negative.    Endocrine: Negative.    Genitourinary: Negative.    Musculoskeletal: Negative.    Skin: Negative.    Allergic/Immunologic: Negative.    Neurological: Negative for tremors, seizures, syncope and weakness.   Hematological: Negative.    Psychiatric/Behavioral: Positive for stress. Negative for agitation, behavioral problems, decreased concentration, dysphoric mood, hallucinations, self-injury, sleep disturbance, suicidal ideas, negative for hyperactivity and depressed mood. The patient is not nervous/anxious.          Physical Exam:   Blood pressure 138/89, pulse 90, temperature 98.2 °F (36.8 °C), temperature source Temporal, resp. rate 18, height 170.2 cm (67.01\"), weight 77.7 kg (171 lb 6.4 oz), SpO2 98 %.    Appearance:  male of stated age in no acute distress  Gait, Station, Strength: WNL    Mental Status Exam:   Hygiene:   good  Cooperation:  Cooperative  Eye Contact:  Good  Psychomotor Behavior:  Appropriate  Affect:  Full range  Mood: normal  Hopelessness: Denies  Speech:  Normal  Thought Process:  Goal directed and Linear  Thought Content:  Normal  Suicidal:  None  Homicidal:  None  Hallucinations:  None  Delusion:  None  Memory:  Intact  Orientation:  Person, Place, Time and Situation  Reliability:  fair  Insight:  Fair  Judgement:  Fair  Impulse Control:  Fair  Physical/Medical Issues:  No        Lab Results:   Admission on 10/01/2019, Discharged on 10/01/2019   Component Date Value Ref Range Status   • Glucose 10/01/2019 91  65 - 99 mg/dL Final   • BUN 10/01/2019 18  6 - 20 " mg/dL Final   • Creatinine 10/01/2019 1.20  0.76 - 1.27 mg/dL Final   • Sodium 10/01/2019 138  136 - 145 mmol/L Final   • Potassium 10/01/2019 3.8  3.5 - 5.2 mmol/L Final   • Chloride 10/01/2019 100  98 - 107 mmol/L Final   • CO2 10/01/2019 26.0  22.0 - 29.0 mmol/L Final   • Calcium 10/01/2019 9.3  8.6 - 10.5 mg/dL Final   • Total Protein 10/01/2019 7.9  6.0 - 8.5 g/dL Final   • Albumin 10/01/2019 4.90  3.50 - 5.20 g/dL Final   • ALT (SGPT) 10/01/2019 30  1 - 41 U/L Final   • AST (SGOT) 10/01/2019 30  1 - 40 U/L Final   • Alkaline Phosphatase 10/01/2019 92  39 - 117 U/L Final   • Total Bilirubin 10/01/2019 0.8  0.2 - 1.2 mg/dL Final   • eGFR Non  Amer 10/01/2019 67  >60 mL/min/1.73 Final   • Globulin 10/01/2019 3.0  gm/dL Final   • A/G Ratio 10/01/2019 1.6  g/dL Final   • BUN/Creatinine Ratio 10/01/2019 15.0  7.0 - 25.0 Final   • Anion Gap 10/01/2019 12.0  5.0 - 15.0 mmol/L Final   • Color, UA 10/01/2019 Yellow  Yellow, Straw Final   • Appearance, UA 10/01/2019 Turbid* Clear Final   • pH, UA 10/01/2019 8.0  5.0 - 8.0 Final   • Specific Gravity, UA 10/01/2019 1.025  1.005 - 1.030 Final   • Glucose, UA 10/01/2019 Negative  Negative Final   • Ketones, UA 10/01/2019 Negative  Negative Final   • Bilirubin, UA 10/01/2019 Negative  Negative Final   • Blood, UA 10/01/2019 Negative  Negative Final   • Protein, UA 10/01/2019 Trace* Negative Final   • Leuk Esterase, UA 10/01/2019 Negative  Negative Final   • Nitrite, UA 10/01/2019 Negative  Negative Final   • Urobilinogen, UA 10/01/2019 1.0 E.U./dL  0.2 - 1.0 E.U./dL Final   • Amphetamine Screen, Urine 10/01/2019 Negative  Negative Final   • Barbiturates Screen, Urine 10/01/2019 Negative  Negative Final   • Benzodiazepine Screen, Urine 10/01/2019 Negative  Negative Final   • Cocaine Screen, Urine 10/01/2019 Negative  Negative Final   • Methadone Screen, Urine 10/01/2019 Negative  Negative Final   • Opiate Screen 10/01/2019 Positive* Negative Final   • Phencyclidine  (PCP), Urine 10/01/2019 Negative  Negative Final   • THC, Screen, Urine 10/01/2019 Negative  Negative Final   • 6-ACETYL MORPHINE 10/01/2019 Negative  Negative Final   • Buprenorphine, Screen, Urine 10/01/2019 Negative  Negative Final   • Oxycodone Screen, Urine 10/01/2019 Negative  Negative Final   • Ethanol 10/01/2019 <10  0 - 10 mg/dL Final   • Ethanol % 10/01/2019 <0.010  % Final   • Magnesium 10/01/2019 2.3  1.6 - 2.6 mg/dL Final   • WBC 10/01/2019 5.82  3.40 - 10.80 10*3/mm3 Final   • RBC 10/01/2019 4.71  4.14 - 5.80 10*6/mm3 Final   • Hemoglobin 10/01/2019 14.9  13.0 - 17.7 g/dL Final   • Hematocrit 10/01/2019 44.3  37.5 - 51.0 % Final   • MCV 10/01/2019 94.1  79.0 - 97.0 fL Final   • MCH 10/01/2019 31.6  26.6 - 33.0 pg Final   • MCHC 10/01/2019 33.6  31.5 - 35.7 g/dL Final   • RDW 10/01/2019 13.1  12.3 - 15.4 % Final   • RDW-SD 10/01/2019 43.6  37.0 - 54.0 fl Final   • MPV 10/01/2019 9.3  6.0 - 12.0 fL Final   • Platelets 10/01/2019 202  140 - 450 10*3/mm3 Final   • Neutrophil % 10/01/2019 56.2  42.7 - 76.0 % Final   • Lymphocyte % 10/01/2019 28.7  19.6 - 45.3 % Final   • Monocyte % 10/01/2019 11.2  5.0 - 12.0 % Final   • Eosinophil % 10/01/2019 3.4  0.3 - 6.2 % Final   • Basophil % 10/01/2019 0.2  0.0 - 1.5 % Final   • Immature Grans % 10/01/2019 0.3  0.0 - 0.5 % Final   • Neutrophils, Absolute 10/01/2019 3.27  1.70 - 7.00 10*3/mm3 Final   • Lymphocytes, Absolute 10/01/2019 1.67  0.70 - 3.10 10*3/mm3 Final   • Monocytes, Absolute 10/01/2019 0.65  0.10 - 0.90 10*3/mm3 Final   • Eosinophils, Absolute 10/01/2019 0.20  0.00 - 0.40 10*3/mm3 Final   • Basophils, Absolute 10/01/2019 0.01  0.00 - 0.20 10*3/mm3 Final   • Immature Grans, Absolute 10/01/2019 0.02  0.00 - 0.05 10*3/mm3 Final       Assessment/Plan    Alcohol dependence with withdrawal (CMS/HCC)  - Ativan detox  - Individual and group psychotherapy  - Introduction to 12 step treatment model       Tobacco use disorder  - Started Chantix        Hydrocodone  use disorder, mild (CMS/HCC)  - Pt report occasional use, and denies cravings or withdrawals, continue to monitor    ADHD  - Hold off on Adderall currently, patient sold last prescription and is currently detoxing from alcohol. Can be restarted at a later time if deemed appropriate.            The patient has been admitted for safety and stabilization.  Patient will be monitored for suicidality daily and maintained on 30 minute rounds for safety .  The patient will have individual and group therapy with a master's level therapist. A master treatment plan will be developed and agreed upon by the patient and his/her treatment team.  The patient's estimated length of stay in the hospital is 5-7 days.       MEDS ORDERED DURING VISIT:  New Medications Ordered This Visit   Medications   • acetaminophen (TYLENOL) tablet 650 mg   • ibuprofen (ADVIL,MOTRIN) tablet 400 mg   • traZODone (DESYREL) tablet 50 mg   • hydrOXYzine (ATARAX) tablet 50 mg   • magnesium hydroxide (MILK OF MAGNESIA) suspension 2400 mg/10mL 10 mL   • loperamide (IMODIUM) capsule 2 mg   • aluminum-magnesium hydroxide-simethicone (MAALOX MAX) 400-400-40 MG/5ML suspension 15 mL   • famotidine (PEPCID) tablet 20 mg   • OR Linked Order Group    • benztropine (COGENTIN) tablet 2 mg    • benztropine (COGENTIN) injection 1 mg   • benzonatate (TESSALON) capsule 100 mg   • sodium chloride nasal spray 2 spray   • ondansetron (ZOFRAN) tablet 4 mg   • vitamin b complex (TOTAL B/C) tablet 2 tablet   • thiamine (VITAMIN B-1) tablet 100 mg   • LORazepam (ATIVAN) tablet 2 mg   • FOLLOWED BY Linked Order Group    • LORazepam (ATIVAN) tablet 2 mg    • LORazepam (ATIVAN) tablet 1.5 mg    • LORazepam (ATIVAN) tablet 1 mg    • LORazepam (ATIVAN) tablet 0.5 mg   • FOLLOWED BY Linked Order Group    • LORazepam (ATIVAN) tablet 2 mg    • LORazepam (ATIVAN) tablet 1.5 mg    • LORazepam (ATIVAN) tablet 1 mg    • LORazepam (ATIVAN) tablet 0.5 mg   • Influenza Vac Subunit Quad  (FLUCELVAX) injection 0.5 mL   • varenicline (CHANTIX) tablet 0.5 mg   • varenicline (CHANTIX) tablet 0.5 mg                This document has been electronically signed by Christopher Butler MD  October 4, 2019 11:48 AM

## 2019-10-04 NOTE — DISCHARGE INSTR - APPOINTMENTS
[facility]  [address] [place] [address]  [phone]  Monday, October 7 at 8:00 PM

## 2019-10-04 NOTE — PLAN OF CARE
Problem: Patient Care Overview  Goal: Plan of Care Review  Outcome: Ongoing (interventions implemented as appropriate)   10/04/19 0247   Coping/Psychosocial   Plan of Care Reviewed With patient   Plan of Care Review   Progress improving       Problem: Overarching Goals (Adult)  Goal: Adheres to Safety Considerations for Self and Others  Outcome: Ongoing (interventions implemented as appropriate)    Goal: Optimized Coping Skills in Response to Life Stressors  Outcome: Outcome(s) achieved Date Met: 10/04/19    Goal: Develops/Participates in Therapeutic [place] to Support Successful Transition  Outcome: Ongoing (interventions implemented as appropriate)      Problem: Impaired Control (Excessive Substance Use) (Adult)  Goal: Participates in Recovery Program (Excessive Substance Use)  Outcome: Ongoing (interventions implemented as appropriate)      Problem: Alcohol Withdrawal Acute, Risk/Actual (Adult)  Goal: Signs and Symptoms of Listed Potential Problems Will be Absent, Minimized or Managed (Alcohol Withdrawal Acute, Risk/Actual)  Outcome: Ongoing (interventions implemented as appropriate)

## 2019-10-04 NOTE — PLAN OF CARE
Problem: Patient Care Overview  Goal: Plan of Care Review  Outcome: Ongoing (interventions implemented as appropriate)   10/04/19 1813   Coping/Psychosocial   Plan of Care Reviewed With patient   Coping/Psychosocial   Patient Agreement with Plan of Care agrees   Plan of Care Review   Progress improving       Problem: Overarching Goals (Adult)  Goal: Adheres to Safety Considerations for Self and Others  Outcome: Ongoing (interventions implemented as appropriate)   10/04/19 1813   Overarching Goals (Adult)   Adheres to Safety Considerations for Self and Others making progress toward outcome     Goal: Develops/Participates in Therapeutic [place] to Support Successful Transition  Outcome: Ongoing (interventions implemented as appropriate)   10/04/19 1813   Overarching Goals (Adult)   Develops/Participates in Therapeutic [place] to Support Successful Transition making progress toward outcome

## 2019-10-04 NOTE — PROGRESS NOTES
[id number]  D: Met with the patient in the office, assessing his needs and assisting him in processing his aftercare plans. The patient had already made a great deal of effort by contacting 5-6 facilities in the [place] area to learn of their programs and his eligibility for admission. He had learned that [facility], his first choice, had a 20-day-waiting list. He scheduled an appointment at [facility] for Monday at 8:00 PM. He also contacted [facility] and learned of their walk-in hours.    A: The patient's affect appeared anxious. He was polite and cooperative. Speech seemed rapid. Thought process seemed linear and goal directed. His motivation to change seemed good.     P: The patient will continue hospitalization. He planned to discharge Monday and be directly admitted to [facility], if [facility] didn't have any beds. [facility].

## 2019-10-05 PROCEDURE — 99231 SBSQ HOSP IP/OBS SF/LOW 25: CPT | Performed by: PSYCHIATRY & NEUROLOGY

## 2019-10-05 RX ADMIN — LORAZEPAM 0.5 MG: 0.5 TABLET ORAL at 14:04

## 2019-10-05 RX ADMIN — TRAZODONE HYDROCHLORIDE 50 MG: 50 TABLET ORAL at 21:17

## 2019-10-05 RX ADMIN — HYDROXYZINE HYDROCHLORIDE 50 MG: 50 TABLET ORAL at 21:17

## 2019-10-05 RX ADMIN — LORAZEPAM 0.5 MG: 0.5 TABLET ORAL at 21:17

## 2019-10-05 RX ADMIN — VARENICLINE TARTRATE 0.5 MG: 0.5 TABLET, FILM COATED ORAL at 08:11

## 2019-10-05 RX ADMIN — Medication 100 MG: at 08:11

## 2019-10-05 RX ADMIN — VITAMIN C 2 TABLET: TAB at 08:11

## 2019-10-05 RX ADMIN — LORAZEPAM 0.5 MG: 0.5 TABLET ORAL at 08:12

## 2019-10-05 NOTE — PLAN OF CARE
Problem: Patient Care Overview  Goal: Plan of Care Review  Outcome: Ongoing (interventions implemented as appropriate)   10/05/19 0038   Coping/Psychosocial   Plan of Care Reviewed With patient   Coping/Psychosocial   Patient Agreement with Plan of Care agrees   Plan of Care Review   Progress improving       Problem: Impaired Control (Excessive Substance Use) (Adult)  Goal: Participates in Recovery Program (Excessive Substance Use)  Outcome: Ongoing (interventions implemented as appropriate)      Problem: Alcohol Withdrawal Acute, Risk/Actual (Adult)  Goal: Signs and Symptoms of Listed Potential Problems Will be Absent, Minimized or Managed (Alcohol Withdrawal Acute, Risk/Actual)  Outcome: Ongoing (interventions implemented as appropriate)

## 2019-10-05 NOTE — PROGRESS NOTES
Subjective   Morteza Judd is a 39 y.o. male who presents today for hospital follow up    Chief Complaint:  Alcohol Dependence     History of Present Illness: Patient is a 40yo CM admitted for alcohol detox.  Patient reports he is tolerating detox well.  He denies any major symptoms today.  He feels that the medications are adequately controlling his symptoms.  Objectively he is not experiencing any withdrawal symptoms.  He is tolerating Chantix without side effects.  He denies cravings.  He denies SI/HI/AVH.     The following portions of the patient's history were reviewed and updated as appropriate: allergies, current medications, past family history, past medical history, past social history, past surgical history and problem list.      Past Medical History:  Past Medical History:   Diagnosis Date   • ADHD (attention deficit hyperactivity disorder)    • Alcoholism (CMS/HCC)        Social History:  Social History     Socioeconomic History   • Marital status: Single     Spouse name: Not on file   • Number of children: Not on file   • Years of education: Not on file   • Highest education level: Not on file   Tobacco Use   • Smoking status: Current Every Day Smoker     Packs/day: 1.00   • Smokeless tobacco: Never Used   Substance and Sexual Activity   • Alcohol use: Yes     Comment: see below   • Drug use: No     Comment: too k some pain pills in past 30 days, not a lot and  only on occ   • Sexual activity: Defer       Family History:  Family History   Problem Relation Age of Onset   • Alcohol abuse Father        Past Surgical History:  Past Surgical History:   Procedure Laterality Date   • NO PAST SURGERIES         Problem List:  Patient Active Problem List   Diagnosis   • Alcohol dependence with withdrawal (CMS/HCC)   • Tobacco use disorder   • Hydrocodone use disorder, mild (CMS/HCC)       Allergy:   No Known Allergies     Current Medications:   Current Facility-Administered Medications   Medication Dose Route  Frequency Provider Last Rate Last Dose   • acetaminophen (TYLENOL) tablet 650 mg  650 mg Oral Q6H PRN Kendall Basilio MD       • aluminum-magnesium hydroxide-simethicone (MAALOX MAX) 400-400-40 MG/5ML suspension 15 mL  15 mL Oral Q6H PRN Kendall Basilio MD       • benzonatate (TESSALON) capsule 100 mg  100 mg Oral TID PRN Kendall Basilio MD       • benztropine (COGENTIN) tablet 2 mg  2 mg Oral Once PRN Kendall Basilio MD        Or   • benztropine (COGENTIN) injection 1 mg  1 mg Intramuscular Once PRN Kendall Basilio MD       • famotidine (PEPCID) tablet 20 mg  20 mg Oral BID PRN Kendall Basilio MD       • hydrOXYzine (ATARAX) tablet 50 mg  50 mg Oral Q6H PRN Kendall Basilio MD   50 mg at 10/04/19 2108   • ibuprofen (ADVIL,MOTRIN) tablet 400 mg  400 mg Oral Q6H PRN Kendall Basilio MD   400 mg at 10/03/19 2058   • loperamide (IMODIUM) capsule 2 mg  2 mg Oral Q2H PRN Kendall Basilio MD       • LORazepam (ATIVAN) tablet 0.5 mg  0.5 mg Oral 3 times per day Kendall Basilio MD   0.5 mg at 10/05/19 0812   • LORazepam (ATIVAN) tablet 0.5 mg  0.5 mg Oral Q4H PRN Kendall Basilio MD       • magnesium hydroxide (MILK OF MAGNESIA) suspension 2400 mg/10mL 10 mL  10 mL Oral Daily PRN Kendall Basilio MD       • ondansetron (ZOFRAN) tablet 4 mg  4 mg Oral Q6H PRN Kendall Basilio MD       • sodium chloride nasal spray 2 spray  2 spray Each Nare PRN Kendall Basilio MD       • thiamine (VITAMIN B-1) tablet 100 mg  100 mg Oral Daily Kendall Basilio MD   100 mg at 10/05/19 0811   • traZODone (DESYREL) tablet 50 mg  50 mg Oral Nightly PRN Kendall Basilio MD   50 mg at 10/04/19 2105   • [START ON 10/6/2019] varenicline (CHANTIX) tablet 0.5 mg  0.5 mg Oral BID With Meals Christopher Butler MD       • vitamin b complex (TOTAL B/C) tablet 2 tablet  2 tablet Oral Daily Kendall Basilio MD   2 tablet at 10/05/19 0811       Review of Symptoms:    Review of Systems   Constitutional: Negative for chills, fever and  "unexpected weight loss.   HENT: Negative.    Eyes: Negative.    Respiratory: Negative.    Cardiovascular: Negative.    Gastrointestinal: Negative.    Endocrine: Negative.    Genitourinary: Negative.    Musculoskeletal: Negative.    Skin: Negative.    Allergic/Immunologic: Negative.    Neurological: Negative for tremors, seizures, syncope and weakness.   Hematological: Negative.    Psychiatric/Behavioral: Negative for agitation, behavioral problems, decreased concentration, dysphoric mood, hallucinations, self-injury, sleep disturbance, suicidal ideas, negative for hyperactivity, depressed mood and stress. The patient is not nervous/anxious.          Physical Exam:   Blood pressure 136/95, pulse 75, temperature 97.2 °F (36.2 °C), temperature source Temporal, resp. rate 18, height 170.2 cm (67.01\"), weight 77.7 kg (171 lb 6.4 oz), SpO2 98 %.    Appearance:  male of stated age in no acute distress  Gait, Station, Strength: WNL    Mental Status Exam:   Hygiene:   good  Cooperation:  Cooperative  Eye Contact:  Good  Psychomotor Behavior:  Appropriate  Affect:  Full range  Mood: normal  Hopelessness: Denies  Speech:  Normal  Thought Process:  Goal directed and Linear  Thought Content:  Normal  Suicidal:  None  Homicidal:  None  Hallucinations:  None  Delusion:  None  Memory:  Intact  Orientation:  Person, Place, Time and Situation  Reliability:  fair  Insight:  Fair  Judgement:  Fair  Impulse Control:  Fair  Physical/Medical Issues:  No        Lab Results:   Admission on 10/01/2019, Discharged on 10/01/2019   Component Date Value Ref Range Status   • Glucose 10/01/2019 91  65 - 99 mg/dL Final   • BUN 10/01/2019 18  6 - 20 mg/dL Final   • Creatinine 10/01/2019 1.20  0.76 - 1.27 mg/dL Final   • Sodium 10/01/2019 138  136 - 145 mmol/L Final   • Potassium 10/01/2019 3.8  3.5 - 5.2 mmol/L Final   • Chloride 10/01/2019 100  98 - 107 mmol/L Final   • CO2 10/01/2019 26.0  22.0 - 29.0 mmol/L Final   • Calcium 10/01/2019 " 9.3  8.6 - 10.5 mg/dL Final   • Total Protein 10/01/2019 7.9  6.0 - 8.5 g/dL Final   • Albumin 10/01/2019 4.90  3.50 - 5.20 g/dL Final   • ALT (SGPT) 10/01/2019 30  1 - 41 U/L Final   • AST (SGOT) 10/01/2019 30  1 - 40 U/L Final   • Alkaline Phosphatase 10/01/2019 92  39 - 117 U/L Final   • Total Bilirubin 10/01/2019 0.8  0.2 - 1.2 mg/dL Final   • eGFR Non  Amer 10/01/2019 67  >60 mL/min/1.73 Final   • Globulin 10/01/2019 3.0  gm/dL Final   • A/G Ratio 10/01/2019 1.6  g/dL Final   • BUN/Creatinine Ratio 10/01/2019 15.0  7.0 - 25.0 Final   • Anion Gap 10/01/2019 12.0  5.0 - 15.0 mmol/L Final   • Color, UA 10/01/2019 Yellow  Yellow, Straw Final   • Appearance, UA 10/01/2019 Turbid* Clear Final   • pH, UA 10/01/2019 8.0  5.0 - 8.0 Final   • Specific Gravity, UA 10/01/2019 1.025  1.005 - 1.030 Final   • Glucose, UA 10/01/2019 Negative  Negative Final   • Ketones, UA 10/01/2019 Negative  Negative Final   • Bilirubin, UA 10/01/2019 Negative  Negative Final   • Blood, UA 10/01/2019 Negative  Negative Final   • Protein, UA 10/01/2019 Trace* Negative Final   • Leuk Esterase, UA 10/01/2019 Negative  Negative Final   • Nitrite, UA 10/01/2019 Negative  Negative Final   • Urobilinogen, UA 10/01/2019 1.0 E.U./dL  0.2 - 1.0 E.U./dL Final   • Amphetamine Screen, Urine 10/01/2019 Negative  Negative Final   • Barbiturates Screen, Urine 10/01/2019 Negative  Negative Final   • Benzodiazepine Screen, Urine 10/01/2019 Negative  Negative Final   • Cocaine Screen, Urine 10/01/2019 Negative  Negative Final   • Methadone Screen, Urine 10/01/2019 Negative  Negative Final   • Opiate Screen 10/01/2019 Positive* Negative Final   • Phencyclidine (PCP), Urine 10/01/2019 Negative  Negative Final   • THC, Screen, Urine 10/01/2019 Negative  Negative Final   • 6-ACETYL MORPHINE 10/01/2019 Negative  Negative Final   • Buprenorphine, Screen, Urine 10/01/2019 Negative  Negative Final   • Oxycodone Screen, Urine 10/01/2019 Negative  Negative Final    • Ethanol 10/01/2019 <10  0 - 10 mg/dL Final   • Ethanol % 10/01/2019 <0.010  % Final   • Magnesium 10/01/2019 2.3  1.6 - 2.6 mg/dL Final   • WBC 10/01/2019 5.82  3.40 - 10.80 10*3/mm3 Final   • RBC 10/01/2019 4.71  4.14 - 5.80 10*6/mm3 Final   • Hemoglobin 10/01/2019 14.9  13.0 - 17.7 g/dL Final   • Hematocrit 10/01/2019 44.3  37.5 - 51.0 % Final   • MCV 10/01/2019 94.1  79.0 - 97.0 fL Final   • MCH 10/01/2019 31.6  26.6 - 33.0 pg Final   • MCHC 10/01/2019 33.6  31.5 - 35.7 g/dL Final   • RDW 10/01/2019 13.1  12.3 - 15.4 % Final   • RDW-SD 10/01/2019 43.6  37.0 - 54.0 fl Final   • MPV 10/01/2019 9.3  6.0 - 12.0 fL Final   • Platelets 10/01/2019 202  140 - 450 10*3/mm3 Final   • Neutrophil % 10/01/2019 56.2  42.7 - 76.0 % Final   • Lymphocyte % 10/01/2019 28.7  19.6 - 45.3 % Final   • Monocyte % 10/01/2019 11.2  5.0 - 12.0 % Final   • Eosinophil % 10/01/2019 3.4  0.3 - 6.2 % Final   • Basophil % 10/01/2019 0.2  0.0 - 1.5 % Final   • Immature Grans % 10/01/2019 0.3  0.0 - 0.5 % Final   • Neutrophils, Absolute 10/01/2019 3.27  1.70 - 7.00 10*3/mm3 Final   • Lymphocytes, Absolute 10/01/2019 1.67  0.70 - 3.10 10*3/mm3 Final   • Monocytes, Absolute 10/01/2019 0.65  0.10 - 0.90 10*3/mm3 Final   • Eosinophils, Absolute 10/01/2019 0.20  0.00 - 0.40 10*3/mm3 Final   • Basophils, Absolute 10/01/2019 0.01  0.00 - 0.20 10*3/mm3 Final   • Immature Grans, Absolute 10/01/2019 0.02  0.00 - 0.05 10*3/mm3 Final       Assessment/Plan    Alcohol dependence with withdrawal (CMS/HCC)  - Ativan detox  - Individual and group psychotherapy  - Introduction to 12 step treatment model  -Plan to go to [facility] at discharge, likely Monday       Tobacco use disorder  - Started Chantix        Hydrocodone use disorder, mild (CMS/HCC)  - Pt report occasional use, and denies cravings or withdrawals, continue to monitor    ADHD  - Hold off on Adderall currently, patient sold last prescription and is currently detoxing from alcohol. Can be  restarted at a later time if deemed appropriate.            The patient has been admitted for safety and stabilization.  Patient will be monitored for suicidality daily and maintained on 30 minute rounds for safety .  The patient will have individual and group therapy with a master's level therapist. A master treatment plan will be developed and agreed upon by the patient and his/her treatment team.  The patient's estimated length of stay in the hospital is 5-7 days.       MEDS ORDERED DURING VISIT:  New Medications Ordered This Visit   Medications   • acetaminophen (TYLENOL) tablet 650 mg   • ibuprofen (ADVIL,MOTRIN) tablet 400 mg   • traZODone (DESYREL) tablet 50 mg   • hydrOXYzine (ATARAX) tablet 50 mg   • magnesium hydroxide (MILK OF MAGNESIA) suspension 2400 mg/10mL 10 mL   • loperamide (IMODIUM) capsule 2 mg   • aluminum-magnesium hydroxide-simethicone (MAALOX MAX) 400-400-40 MG/5ML suspension 15 mL   • famotidine (PEPCID) tablet 20 mg   • OR Linked Order Group    • benztropine (COGENTIN) tablet 2 mg    • benztropine (COGENTIN) injection 1 mg   • benzonatate (TESSALON) capsule 100 mg   • sodium chloride nasal spray 2 spray   • ondansetron (ZOFRAN) tablet 4 mg   • vitamin b complex (TOTAL B/C) tablet 2 tablet   • thiamine (VITAMIN B-1) tablet 100 mg   • LORazepam (ATIVAN) tablet 2 mg   • FOLLOWED BY Linked Order Group    • LORazepam (ATIVAN) tablet 2 mg    • LORazepam (ATIVAN) tablet 1.5 mg    • LORazepam (ATIVAN) tablet 1 mg    • LORazepam (ATIVAN) tablet 0.5 mg   • FOLLOWED BY Linked Order Group    • LORazepam (ATIVAN) tablet 2 mg    • LORazepam (ATIVAN) tablet 1.5 mg    • LORazepam (ATIVAN) tablet 1 mg    • LORazepam (ATIVAN) tablet 0.5 mg   • Influenza Vac Subunit Quad (FLUCELVAX) injection 0.5 mL   • varenicline (CHANTIX) tablet 0.5 mg   • varenicline (CHANTIX) tablet 0.5 mg                This document has been electronically signed by Christopher Butler MD  October 5, 2019 1:42 PM

## 2019-10-05 NOTE — PLAN OF CARE
Problem: Patient Care Overview  Goal: Plan of Care Review  Outcome: Ongoing (interventions implemented as appropriate)  Pt cooperative. Friendly and talkative with peers and staff. Pt denies any withdrawal symptoms at this time. Will continue to monitor.   10/05/19 1817   Coping/Psychosocial   Plan of Care Reviewed With patient   Coping/Psychosocial   Patient Agreement with Plan of Care agrees   Plan of Care Review   Progress improving     Goal: Individualization and Mutuality  Outcome: Ongoing (interventions implemented as appropriate)    Goal: Discharge Needs Assessment  Outcome: Ongoing (interventions implemented as appropriate)    Goal: Interprofessional Rounds/Family Conf  Outcome: Ongoing (interventions implemented as appropriate)      Problem: Overarching Goals (Adult)  Goal: Adheres to Safety Considerations for Self and Others  Outcome: Ongoing (interventions implemented as appropriate)    Goal: Develops/Participates in Therapeutic [place] to Support Successful Transition  Outcome: Ongoing (interventions implemented as appropriate)

## 2019-10-06 PROCEDURE — 99232 SBSQ HOSP IP/OBS MODERATE 35: CPT | Performed by: PSYCHIATRY & NEUROLOGY

## 2019-10-06 RX ORDER — DEXTROAMPHETAMINE SACCHARATE, AMPHETAMINE ASPARTATE MONOHYDRATE, DEXTROAMPHETAMINE SULFATE AND AMPHETAMINE SULFATE 2.5; 2.5; 2.5; 2.5 MG/1; MG/1; MG/1; MG/1
20 CAPSULE, EXTENDED RELEASE ORAL DAILY
Status: DISCONTINUED | OUTPATIENT
Start: 2019-10-06 | End: 2019-10-07 | Stop reason: HOSPADM

## 2019-10-06 RX ADMIN — TRAZODONE HYDROCHLORIDE 50 MG: 50 TABLET ORAL at 22:35

## 2019-10-06 RX ADMIN — DEXTROAMPHETAMINE SACCHARATE, AMPHETAMINE ASPARTATE MONOHYDRATE, DEXTROAMPHETAMINE SULFATE, AND AMPHETAMINE SULFATE 20 MG: 2.5; 2.5; 2.5; 2.5 CAPSULE, EXTENDED RELEASE ORAL at 10:27

## 2019-10-06 RX ADMIN — VARENICLINE TARTRATE 0.5 MG: 0.5 TABLET, FILM COATED ORAL at 08:05

## 2019-10-06 RX ADMIN — VARENICLINE TARTRATE 0.5 MG: 0.5 TABLET, FILM COATED ORAL at 17:08

## 2019-10-06 RX ADMIN — HYDROXYZINE HYDROCHLORIDE 50 MG: 50 TABLET ORAL at 21:19

## 2019-10-06 RX ADMIN — Medication 100 MG: at 08:05

## 2019-10-06 RX ADMIN — VITAMIN C 2 TABLET: TAB at 08:05

## 2019-10-06 RX ADMIN — HYDROXYZINE HYDROCHLORIDE 50 MG: 50 TABLET ORAL at 03:29

## 2019-10-06 NOTE — PROGRESS NOTES
Subjective   Morteza Judd is a 39 y.o. male who presents today for hospital follow up    Chief Complaint:  Alcohol Dependence     History of Present Illness: Patient is a 38yo CM admitted for alcohol detox.  He continues to do well with detox with no major withdrawal symptoms.  He denies major cravings.  He reports that he did have some vivid dreams last night and attributes this to Chantix which can be a possible side effect.  We will restart his Adderall today.  He was taking instant release 3 times a day and we will start back on an extended release 1 time a day as it has less abuse potential and would like patient to be able to focus and concentrate when going through inpatient rehab.  He denies SI/HI/AVH.     The following portions of the patient's history were reviewed and updated as appropriate: allergies, current medications, past family history, past medical history, past social history, past surgical history and problem list.      Past Medical History:  Past Medical History:   Diagnosis Date   • ADHD (attention deficit hyperactivity disorder)    • Alcoholism (CMS/[facility])        Social History:  Social History     Socioeconomic History   • Marital status: Single     Spouse name: Not on file   • Number of children: Not on file   • Years of education: Not on file   • Highest education level: Not on file   Tobacco Use   • Smoking status: Current Every Day Smoker     Packs/day: 1.00   • Smokeless tobacco: Never Used   Substance and Sexual Activity   • Alcohol use: Yes     Comment: see below   • Drug use: No     Comment: too k some pain pills in past 30 days, not a lot and  only on occ   • Sexual activity: Defer       Family History:  Family History   Problem Relation Age of Onset   • Alcohol abuse Father        Past Surgical History:  Past Surgical History:   Procedure Laterality Date   • NO PAST SURGERIES         Problem List:  Patient Active Problem List   Diagnosis   • Alcohol dependence with withdrawal  (CMS/[facility])   • Tobacco use disorder   • Hydrocodone use disorder, mild (CMS/[facility])       Allergy:   No Known Allergies     Current Medications:   Current Facility-Administered Medications   Medication Dose Route Frequency Provider Last Rate Last Dose   • acetaminophen (TYLENOL) tablet 650 mg  650 mg Oral Q6H PRN Kendall Basilio MD       • aluminum-magnesium hydroxide-simethicone (MAALOX MAX) 400-400-40 MG/5ML suspension 15 mL  15 mL Oral Q6H PRN Kendall Basilio MD       • amphetamine-dextroamphetamine XR (ADDERALL XR) 24 hr capsule 20 mg  20 mg Oral Daily Christopher Butler MD   20 mg at 10/06/19 1027   • benzonatate (TESSALON) capsule 100 mg  100 mg Oral TID PRN Kendall Basilio MD       • benztropine (COGENTIN) tablet 2 mg  2 mg Oral Once PRN Kendall Basilio MD        Or   • benztropine (COGENTIN) injection 1 mg  1 mg Intramuscular Once PRN Kendall Basilio MD       • famotidine (PEPCID) tablet 20 mg  20 mg Oral BID PRN Kendall Basilio MD       • hydrOXYzine (ATARAX) tablet 50 mg  50 mg Oral Q6H PRN Kendall Basilio MD   50 mg at 10/06/19 0329   • ibuprofen (ADVIL,MOTRIN) tablet 400 mg  400 mg Oral Q6H PRN Kendall Basilio MD   400 mg at 10/03/19 2058   • loperamide (IMODIUM) capsule 2 mg  2 mg Oral Q2H PRN Kendall Basilio MD       • magnesium hydroxide (MILK OF MAGNESIA) suspension 2400 mg/10mL 10 mL  10 mL Oral Daily PRN Kendall Basilio MD       • ondansetron (ZOFRAN) tablet 4 mg  4 mg Oral Q6H PRN Kendall Basilio MD       • sodium chloride nasal spray 2 spray  2 spray Each Nare PRN Kendall Basilio MD       • thiamine (VITAMIN B-1) tablet 100 mg  100 mg Oral Daily Kendall Basilio MD   100 mg at 10/06/19 0805   • traZODone (DESYREL) tablet 50 mg  50 mg Oral Nightly PRN Kendall Basilio MD   50 mg at 10/05/19 2117   • varenicline (CHANTIX) tablet 0.5 mg  0.5 mg Oral BID With Meals Christopher Butler MD   0.5 mg at 10/06/19 0805   • vitamin b complex (TOTAL B/C) tablet 2 tablet  2 tablet Oral  "Daily Kendall Basilio MD   2 tablet at 10/06/19 0805       Review of Symptoms:    Review of Systems   Constitutional: Negative for chills, fever and unexpected weight loss.   HENT: Negative.    Eyes: Negative.    Respiratory: Negative.    Cardiovascular: Negative.    Gastrointestinal: Negative.    Endocrine: Negative.    Genitourinary: Negative.    Musculoskeletal: Negative.    Skin: Negative.    Allergic/Immunologic: Negative.    Neurological: Negative for tremors, seizures, syncope and weakness.   Hematological: Negative.    Psychiatric/Behavioral: Negative for agitation, behavioral problems, decreased concentration, dysphoric mood, hallucinations, self-injury, sleep disturbance, suicidal ideas, negative for hyperactivity, depressed mood and stress. The patient is not nervous/anxious.          Physical Exam:   Blood pressure 149/84, pulse 83, temperature 97.5 °F (36.4 °C), temperature source Temporal, resp. rate 18, height 170.2 cm (67.01\"), weight 77.7 kg (171 lb 6.4 oz), SpO2 97 %.    Appearance:  male of stated age in no acute distress  Gait, Station, Strength: WNL    Mental Status Exam:   Hygiene:   good  Cooperation:  Cooperative  Eye Contact:  Good  Psychomotor Behavior:  Appropriate  Affect:  Full range  Mood: normal  Hopelessness: Denies  Speech:  Normal  Thought Process:  Goal directed and Linear  Thought Content:  Normal  Suicidal:  None  Homicidal:  None  Hallucinations:  None  Delusion:  None  Memory:  Intact  Orientation:  Person, Place, Time and Situation  Reliability:  fair  Insight:  Fair  Judgement:  Fair  Impulse Control:  Fair  Physical/Medical Issues:  No        Lab Results:   Admission on 10/01/2019, Discharged on 10/01/2019   Component Date Value Ref Range Status   • Glucose 10/01/2019 91  65 - 99 mg/dL Final   • BUN 10/01/2019 18  6 - 20 mg/dL Final   • Creatinine 10/01/2019 1.20  0.76 - 1.27 mg/dL Final   • Sodium 10/01/2019 138  136 - 145 mmol/L Final   • Potassium 10/01/2019 " 3.8  3.5 - 5.2 mmol/L Final   • Chloride 10/01/2019 100  98 - 107 mmol/L Final   • CO2 10/01/2019 26.0  22.0 - 29.0 mmol/L Final   • Calcium 10/01/2019 9.3  8.6 - 10.5 mg/dL Final   • Total Protein 10/01/2019 7.9  6.0 - 8.5 g/dL Final   • Albumin 10/01/2019 4.90  3.50 - 5.20 g/dL Final   • ALT (SGPT) 10/01/2019 30  1 - 41 U/L Final   • AST (SGOT) 10/01/2019 30  1 - 40 U/L Final   • Alkaline Phosphatase 10/01/2019 92  39 - 117 U/L Final   • Total Bilirubin 10/01/2019 0.8  0.2 - 1.2 mg/dL Final   • eGFR Non  Amer 10/01/2019 67  >60 mL/min/1.73 Final   • Globulin 10/01/2019 3.0  gm/dL Final   • A/G Ratio 10/01/2019 1.6  g/dL Final   • BUN/Creatinine Ratio 10/01/2019 15.0  7.0 - 25.0 Final   • Anion Gap 10/01/2019 12.0  5.0 - 15.0 mmol/L Final   • Color, UA 10/01/2019 Yellow  Yellow, Straw Final   • Appearance, UA 10/01/2019 Turbid* Clear Final   • pH, UA 10/01/2019 8.0  5.0 - 8.0 Final   • Specific Gravity, UA 10/01/2019 1.025  1.005 - 1.030 Final   • Glucose, UA 10/01/2019 Negative  Negative Final   • Ketones, UA 10/01/2019 Negative  Negative Final   • Bilirubin, UA 10/01/2019 Negative  Negative Final   • Blood, UA 10/01/2019 Negative  Negative Final   • Protein, UA 10/01/2019 Trace* Negative Final   • Leuk Esterase, UA 10/01/2019 Negative  Negative Final   • Nitrite, UA 10/01/2019 Negative  Negative Final   • Urobilinogen, UA 10/01/2019 1.0 E.U./dL  0.2 - 1.0 E.U./dL Final   • Amphetamine Screen, Urine 10/01/2019 Negative  Negative Final   • Barbiturates Screen, Urine 10/01/2019 Negative  Negative Final   • Benzodiazepine Screen, Urine 10/01/2019 Negative  Negative Final   • Cocaine Screen, Urine 10/01/2019 Negative  Negative Final   • Methadone Screen, Urine 10/01/2019 Negative  Negative Final   • Opiate Screen 10/01/2019 Positive* Negative Final   • Phencyclidine (PCP), Urine 10/01/2019 Negative  Negative Final   • THC, Screen, Urine 10/01/2019 Negative  Negative Final   • 6-ACETYL MORPHINE 10/01/2019  Negative  Negative Final   • Buprenorphine, Screen, Urine 10/01/2019 Negative  Negative Final   • Oxycodone Screen, Urine 10/01/2019 Negative  Negative Final   • Ethanol 10/01/2019 <10  0 - 10 mg/dL Final   • Ethanol % 10/01/2019 <0.010  % Final   • Magnesium 10/01/2019 2.3  1.6 - 2.6 mg/dL Final   • WBC 10/01/2019 5.82  3.40 - 10.80 10*3/mm3 Final   • RBC 10/01/2019 4.71  4.14 - 5.80 10*6/mm3 Final   • Hemoglobin 10/01/2019 14.9  13.0 - 17.7 g/dL Final   • Hematocrit 10/01/2019 44.3  37.5 - 51.0 % Final   • MCV 10/01/2019 94.1  79.0 - 97.0 fL Final   • MCH 10/01/2019 31.6  26.6 - 33.0 pg Final   • MCHC 10/01/2019 33.6  31.5 - 35.7 g/dL Final   • RDW 10/01/2019 13.1  12.3 - 15.4 % Final   • RDW-SD 10/01/2019 43.6  37.0 - 54.0 fl Final   • MPV 10/01/2019 9.3  6.0 - 12.0 fL Final   • Platelets 10/01/2019 202  140 - 450 10*3/mm3 Final   • Neutrophil % 10/01/2019 56.2  42.7 - 76.0 % Final   • Lymphocyte % 10/01/2019 28.7  19.6 - 45.3 % Final   • Monocyte % 10/01/2019 11.2  5.0 - 12.0 % Final   • Eosinophil % 10/01/2019 3.4  0.3 - 6.2 % Final   • Basophil % 10/01/2019 0.2  0.0 - 1.5 % Final   • Immature Grans % 10/01/2019 0.3  0.0 - 0.5 % Final   • Neutrophils, Absolute 10/01/2019 3.27  1.70 - 7.00 10*3/mm3 Final   • Lymphocytes, Absolute 10/01/2019 1.67  0.70 - 3.10 10*3/mm3 Final   • Monocytes, Absolute 10/01/2019 0.65  0.10 - 0.90 10*3/mm3 Final   • Eosinophils, Absolute 10/01/2019 0.20  0.00 - 0.40 10*3/mm3 Final   • Basophils, Absolute 10/01/2019 0.01  0.00 - 0.20 10*3/mm3 Final   • Immature Grans, Absolute 10/01/2019 0.02  0.00 - 0.05 10*3/mm3 Final       Assessment/Plan    Alcohol dependence with withdrawal (CMS/HCC)  - Ativan detox  - Individual and group psychotherapy  - Introduction to 12 step treatment model  -Plan to go to [facility] at discharge, likely Monday       Tobacco use disorder  - Started Chantix        Hydrocodone use disorder, mild (CMS/HCC)  - Pt report occasional use, and denies cravings or  withdrawals, continue to monitor    ADHD  -Patient was previously on Adderall 20 mg 3 times daily.  We will restart Adderall XR 20 mg p.o. daily for now as it has less abuse potential and it is important that patient is able to adequately concentrate and focus during the next phase of his treatment.  He plans to go to rehab tomorrow.  -BRYN reviewed and appropriate. Patient counseled on use of controlled substances.       Anticipate discharge tomorrow           The patient has been admitted for safety and stabilization.  Patient will be monitored for suicidality daily and maintained on 30 minute rounds for safety .  The patient will have individual and group therapy with a master's level therapist. A master treatment plan will be developed and agreed upon by the patient and his/her treatment team.  The patient's estimated length of stay in the hospital is 5-7 days.       MEDS ORDERED DURING VISIT:  New Medications Ordered This Visit   Medications   • acetaminophen (TYLENOL) tablet 650 mg   • ibuprofen (ADVIL,MOTRIN) tablet 400 mg   • traZODone (DESYREL) tablet 50 mg   • hydrOXYzine (ATARAX) tablet 50 mg   • magnesium hydroxide (MILK OF MAGNESIA) suspension 2400 mg/10mL 10 mL   • loperamide (IMODIUM) capsule 2 mg   • aluminum-magnesium hydroxide-simethicone (MAALOX MAX) 400-400-40 MG/5ML suspension 15 mL   • famotidine (PEPCID) tablet 20 mg   • OR Linked Order Group    • benztropine (COGENTIN) tablet 2 mg    • benztropine (COGENTIN) injection 1 mg   • benzonatate (TESSALON) capsule 100 mg   • sodium chloride nasal spray 2 spray   • ondansetron (ZOFRAN) tablet 4 mg   • vitamin b complex (TOTAL B/C) tablet 2 tablet   • thiamine (VITAMIN B-1) tablet 100 mg   • LORazepam (ATIVAN) tablet 2 mg   • FOLLOWED BY Linked Order Group    • LORazepam (ATIVAN) tablet 2 mg    • LORazepam (ATIVAN) tablet 1.5 mg    • LORazepam (ATIVAN) tablet 1 mg    • LORazepam (ATIVAN) tablet 0.5 mg   • FOLLOWED BY Linked Order Group    •  LORazepam (ATIVAN) tablet 2 mg    • LORazepam (ATIVAN) tablet 1.5 mg    • LORazepam (ATIVAN) tablet 1 mg    • LORazepam (ATIVAN) tablet 0.5 mg   • Influenza Vac Subunit Quad (FLUCELVAX) injection 0.5 mL   • varenicline (CHANTIX) tablet 0.5 mg   • varenicline (CHANTIX) tablet 0.5 mg   • amphetamine-dextroamphetamine XR (ADDERALL XR) 24 hr capsule 20 mg                This document has been electronically signed by Christopher Butler MD  October 6, 2019 11:32 AM

## 2019-10-06 NOTE — PLAN OF CARE
Problem: Patient Care Overview  Goal: Plan of Care Review  Outcome: Ongoing (interventions implemented as appropriate)   10/06/19 8059   Coping/Psychosocial   Plan of Care Reviewed With patient   Coping/Psychosocial   Patient Agreement with Plan of Care agrees   Plan of Care Review   Progress improving   OTHER   Outcome Summary pt very talkative.pleasant and cooperative.

## 2019-10-06 NOTE — PLAN OF CARE
Problem: Patient Care Overview  Goal: Plan of Care Review  Outcome: Ongoing (interventions implemented as appropriate)   10/06/19 0607   Coping/Psychosocial   Plan of Care Reviewed With patient   Coping/Psychosocial   Patient Agreement with Plan of Care agrees   Plan of Care Review   Progress improving       Problem: Overarching Goals (Adult)  Goal: Adheres to Safety Considerations for Self and Others  Outcome: Outcome(s) achieved Date Met: 10/06/19    Goal: Develops/Participates in Therapeutic [place] to Support Successful Transition  Outcome: Outcome(s) achieved Date Met: 10/06/19      Problem: Impaired Control (Excessive Substance Use) (Adult)  Goal: Participates in Recovery Program (Excessive Substance Use)  Outcome: Ongoing (interventions implemented as appropriate)      Problem: Alcohol Withdrawal Acute, Risk/Actual (Adult)  Goal: Signs and Symptoms of Listed Potential Problems Will be Absent, Minimized or Managed (Alcohol Withdrawal Acute, Risk/Actual)  Outcome: Ongoing (interventions implemented as appropriate)

## 2019-10-07 VITALS
OXYGEN SATURATION: 97 % | SYSTOLIC BLOOD PRESSURE: 141 MMHG | HEART RATE: 85 BPM | DIASTOLIC BLOOD PRESSURE: 99 MMHG | TEMPERATURE: 97.8 F | WEIGHT: 171.4 LBS | RESPIRATION RATE: 18 BRPM | BODY MASS INDEX: 26.9 KG/M2 | HEIGHT: 67 IN

## 2019-10-07 PROBLEM — F90.9 ADHD: Status: ACTIVE | Noted: 2019-10-07

## 2019-10-07 PROCEDURE — 99239 HOSP IP/OBS DSCHRG MGMT >30: CPT | Performed by: PSYCHIATRY & NEUROLOGY

## 2019-10-07 RX ORDER — VARENICLINE TARTRATE 0.5 MG/1
0.5 TABLET, FILM COATED ORAL 2 TIMES DAILY WITH MEALS
Qty: 5 TABLET | Refills: 0 | Status: SHIPPED | OUTPATIENT
Start: 2019-10-07 | End: 2020-05-18

## 2019-10-07 RX ORDER — VARENICLINE TARTRATE 1 MG/1
1 TABLET, FILM COATED ORAL 2 TIMES DAILY
Qty: 56 TABLET | Refills: 0 | Status: SHIPPED | OUTPATIENT
Start: 2019-10-13 | End: 2020-05-18

## 2019-10-07 RX ADMIN — IBUPROFEN 400 MG: 400 TABLET, FILM COATED ORAL at 00:38

## 2019-10-07 RX ADMIN — VITAMIN C 2 TABLET: TAB at 08:17

## 2019-10-07 RX ADMIN — DEXTROAMPHETAMINE SACCHARATE, AMPHETAMINE ASPARTATE MONOHYDRATE, DEXTROAMPHETAMINE SULFATE, AND AMPHETAMINE SULFATE 20 MG: 2.5; 2.5; 2.5; 2.5 CAPSULE, EXTENDED RELEASE ORAL at 08:17

## 2019-10-07 RX ADMIN — VARENICLINE TARTRATE 0.5 MG: 0.5 TABLET, FILM COATED ORAL at 08:17

## 2019-10-07 RX ADMIN — Medication 100 MG: at 08:17

## 2019-10-07 NOTE — PLAN OF CARE
Problem: Patient Care Overview  Goal: Plan of Care Review  Outcome: Ongoing (interventions implemented as appropriate)   10/07/19 0104   Coping/Psychosocial   Plan of Care Reviewed With patient   Coping/Psychosocial   Patient Agreement with Plan of Care agrees   Plan of Care Review   Progress improving   OTHER   Outcome Summary Pt very talkative. Pt reports he is being discharged in the am.       Problem: Impaired Control (Excessive Substance Use) (Adult)  Goal: Participates in Recovery Program (Excessive Substance Use)  Outcome: Ongoing (interventions implemented as appropriate)      Problem: Alcohol Withdrawal Acute, Risk/Actual (Adult)  Goal: Signs and Symptoms of Listed Potential Problems Will be Absent, Minimized or Managed (Alcohol Withdrawal Acute, Risk/Actual)  Outcome: Ongoing (interventions implemented as appropriate)

## 2019-10-07 NOTE — DISCHARGE SUMMARY
PSYCHIATRIC DISCHARGE SUMMARY     Patient Identification:  Name:  Morteza Judd  Age:  39 y.o.  Sex:  male  :  1980  MRN:  [id number]  Visit Number:  [id number]      Date of Admission:10/1/2019   Date of Discharge:  10/7/2019    Discharge Diagnosis:  Active Problems:    Alcohol dependence with withdrawal (CMS/HCC)    Tobacco use disorder    Hydrocodone use disorder, mild (CMS/HCC)        Admission Diagnosis:  Alcohol dependence (CMS/HCC) [F10.20]     Hospital Course  Patient is a 39 y.o. male presented with alcohol use and withdrawals. The patient was admitted to the [facility] detox recovery unit for safety, further evaluation and treatment.  The patient was started on Ativan detox along with thiamine and folate. He was able to complete the detox without any complications.  The patient was started on Chantix for smoking cessation. He was advised about the risks and benefits and he agreed to seek help if he felt more depressed, anxious or had sleep walking problems to stop the medication and seek help.  The patient reported a history of ADHD symptoms and was in treatment in the past for it and had taken Vyvanse and Adderall in the past.   The patient at first was not continued on Adderall but was then started on Adderall XR. A detailed discussion was held with him about the risks and benefits, and the patient was on ADHD medication and then started drinking along with it and drinking got out of hand, and he reported a need for higher doses of ADHD medication. The risk of cross addiction was discussed and it was decided for the patient to avoid any addictive substances at this time.  The patient also reported taking occasional pain pills and monitored for withdrawals but didn't exhibit any.  The patient was also able to take part in individual and group counseling sessions and work on appropriate coping skills.  The patient made steady improvement in his mood and expressed feeling more positive and  "hopeful about future. Sleep and appetite were improved.  The day of discharge the patient was calm, cooperative and pleasant. Mood was reported to be good, and denied SI/HI/AVH. Also reported no medication side effects.        Mental Status Exam upon discharge:   Mood \"anxious\"   Affect-congruent, appropriate, stable  Thought Content-goal directed, no delusional material present  Thought process-linear, organized.  Suicidality: No SI  Homicidality: No HI  Perception: No AH/VH    Procedures Performed         Consults:   Consults     No orders found from 9/2/2019 to 10/2/2019.          Pertinent Test Results:   UDS positive for opiates    Condition on Discharge:  improved    Vital Signs  Temp:  [97.8 °F (36.6 °C)-98.5 °F (36.9 °C)] 97.8 °F (36.6 °C)  Heart Rate:  [] 85  Resp:  [18] 18  BP: (133-147)/(87-99) 141/99      Discharge Disposition:  Home or Self Care    Discharge Medications:     Discharge Medications      New Medications      Instructions Start Date   varenicline 0.5 MG tablet  Commonly known as:  CHANTIX   0.5 mg, Oral, 2 Times Daily With Meals      varenicline 1 MG tablet  Commonly known as:  CHANTIX CONTINUING MONTH FELICITA   1 mg, Oral, 2 Times Daily   Start Date:  10/13/2019        Stop These Medications    amphetamine-dextroamphetamine 20 MG tablet  Commonly known as:  ADDERALL            Discharge Diet: Regular    Activity at Discharge: As tolerated    Follow-up Appointments    [facility]    Test Results Pending at Discharge     Time spent in discharge and counseling 35 minutes.    Clinician:   Aureliano Worley MD  10/07/19  10:44 AM  "

## 2019-12-17 ENCOUNTER — HOSPITAL ENCOUNTER (EMERGENCY)
Facility: HOSPITAL | Age: 39
Discharge: HOME | End: 2019-12-18
Attending: EMERGENCY MEDICINE | Admitting: INTERNAL MEDICINE

## 2019-12-17 DIAGNOSIS — T18.128A ESOPHAGEAL OBSTRUCTION DUE TO FOOD IMPACTION: Primary | ICD-10-CM

## 2019-12-17 DIAGNOSIS — K22.2 ESOPHAGEAL OBSTRUCTION DUE TO FOOD IMPACTION: Primary | ICD-10-CM

## 2019-12-17 PROCEDURE — 96375 TX/PRO/DX INJ NEW DRUG ADDON: CPT

## 2019-12-17 PROCEDURE — 25010000002 CALCIUM GLUCONATE PER 10 ML: Performed by: EMERGENCY MEDICINE

## 2019-12-17 PROCEDURE — 25010000002 LORAZEPAM PER 2 MG: Performed by: EMERGENCY MEDICINE

## 2019-12-17 PROCEDURE — 99283 EMERGENCY DEPT VISIT LOW MDM: CPT

## 2019-12-17 RX ORDER — SODIUM CHLORIDE 0.9 % (FLUSH) 0.9 %
10 SYRINGE (ML) INJECTION AS NEEDED
Status: DISCONTINUED | OUTPATIENT
Start: 2019-12-17 | End: 2019-12-18 | Stop reason: HOSPADM

## 2019-12-17 RX ORDER — LORAZEPAM 2 MG/ML
1 INJECTION INTRAMUSCULAR ONCE
Status: COMPLETED | OUTPATIENT
Start: 2019-12-17 | End: 2019-12-17

## 2019-12-17 RX ADMIN — CALCIUM GLUCONATE 1 G: 98 INJECTION, SOLUTION INTRAVENOUS at 23:41

## 2019-12-17 RX ADMIN — LORAZEPAM 1 MG: 2 INJECTION INTRAMUSCULAR; INTRAVENOUS at 23:41

## 2019-12-18 ENCOUNTER — ANESTHESIA (OUTPATIENT)
Dept: PERIOP | Facility: HOSPITAL | Age: 39
End: 2019-12-18

## 2019-12-18 ENCOUNTER — ANESTHESIA EVENT (OUTPATIENT)
Dept: PERIOP | Facility: HOSPITAL | Age: 39
End: 2019-12-18

## 2019-12-18 ENCOUNTER — ON CAMPUS - OUTPATIENT (OUTPATIENT)
Dept: URBAN - METROPOLITAN AREA HOSPITAL 114 | Facility: HOSPITAL | Age: 39
End: 2019-12-18
Payer: MEDICAID

## 2019-12-18 VITALS
TEMPERATURE: 98 F | HEIGHT: 68 IN | HEART RATE: 91 BPM | BODY MASS INDEX: 25.76 KG/M2 | DIASTOLIC BLOOD PRESSURE: 80 MMHG | SYSTOLIC BLOOD PRESSURE: 112 MMHG | WEIGHT: 170 LBS | RESPIRATION RATE: 18 BRPM | OXYGEN SATURATION: 96 %

## 2019-12-18 DIAGNOSIS — T18.128A FOOD IN ESOPHAGUS CAUSING OTHER INJURY, INITIAL ENCOUNTER: ICD-10-CM

## 2019-12-18 DIAGNOSIS — K22.8 OTHER SPECIFIED DISEASES OF ESOPHAGUS: ICD-10-CM

## 2019-12-18 PROCEDURE — 25010000002 FENTANYL CITRATE (PF) 100 MCG/2ML SOLUTION: Performed by: ANESTHESIOLOGY

## 2019-12-18 PROCEDURE — 25010000002 PROPOFOL 10 MG/ML EMULSION: Performed by: ANESTHESIOLOGY

## 2019-12-18 PROCEDURE — 25010000002 MIDAZOLAM PER 1 MG: Performed by: ANESTHESIOLOGY

## 2019-12-18 PROCEDURE — 43247 EGD REMOVE FOREIGN BODY: CPT | Performed by: INTERNAL MEDICINE

## 2019-12-18 PROCEDURE — 25010000002 SUCCINYLCHOLINE PER 20 MG: Performed by: ANESTHESIOLOGY

## 2019-12-18 PROCEDURE — 96365 THER/PROPH/DIAG IV INF INIT: CPT

## 2019-12-18 RX ORDER — GLYCOPYRROLATE 0.2 MG/ML
INJECTION INTRAMUSCULAR; INTRAVENOUS AS NEEDED
Status: DISCONTINUED | OUTPATIENT
Start: 2019-12-18 | End: 2019-12-18 | Stop reason: SURG

## 2019-12-18 RX ORDER — OXYCODONE AND ACETAMINOPHEN 7.5; 325 MG/1; MG/1
1 TABLET ORAL ONCE AS NEEDED
Status: CANCELLED | OUTPATIENT
Start: 2019-12-18

## 2019-12-18 RX ORDER — PROMETHAZINE HYDROCHLORIDE 25 MG/ML
12.5 INJECTION, SOLUTION INTRAMUSCULAR; INTRAVENOUS ONCE AS NEEDED
Status: CANCELLED | OUTPATIENT
Start: 2019-12-18

## 2019-12-18 RX ORDER — LIDOCAINE HYDROCHLORIDE 10 MG/ML
0.5 INJECTION, SOLUTION EPIDURAL; INFILTRATION; INTRACAUDAL; PERINEURAL ONCE AS NEEDED
Status: DISCONTINUED | OUTPATIENT
Start: 2019-12-18 | End: 2019-12-18 | Stop reason: HOSPADM

## 2019-12-18 RX ORDER — PROPOFOL 10 MG/ML
VIAL (ML) INTRAVENOUS AS NEEDED
Status: DISCONTINUED | OUTPATIENT
Start: 2019-12-18 | End: 2019-12-18 | Stop reason: SURG

## 2019-12-18 RX ORDER — SODIUM CHLORIDE, SODIUM LACTATE, POTASSIUM CHLORIDE, CALCIUM CHLORIDE 600; 310; 30; 20 MG/100ML; MG/100ML; MG/100ML; MG/100ML
9 INJECTION, SOLUTION INTRAVENOUS CONTINUOUS
Status: DISCONTINUED | OUTPATIENT
Start: 2019-12-18 | End: 2019-12-18 | Stop reason: HOSPADM

## 2019-12-18 RX ORDER — FENTANYL CITRATE 50 UG/ML
50 INJECTION, SOLUTION INTRAMUSCULAR; INTRAVENOUS
Status: DISCONTINUED | OUTPATIENT
Start: 2019-12-18 | End: 2019-12-18 | Stop reason: HOSPADM

## 2019-12-18 RX ORDER — PROMETHAZINE HYDROCHLORIDE 25 MG/1
25 SUPPOSITORY RECTAL ONCE AS NEEDED
Status: CANCELLED | OUTPATIENT
Start: 2019-12-18

## 2019-12-18 RX ORDER — SUCCINYLCHOLINE CHLORIDE 20 MG/ML
INJECTION INTRAMUSCULAR; INTRAVENOUS AS NEEDED
Status: DISCONTINUED | OUTPATIENT
Start: 2019-12-18 | End: 2019-12-18 | Stop reason: SURG

## 2019-12-18 RX ORDER — SODIUM CHLORIDE 0.9 % (FLUSH) 0.9 %
3 SYRINGE (ML) INJECTION EVERY 12 HOURS SCHEDULED
Status: DISCONTINUED | OUTPATIENT
Start: 2019-12-18 | End: 2019-12-18 | Stop reason: HOSPADM

## 2019-12-18 RX ORDER — LABETALOL HYDROCHLORIDE 5 MG/ML
5 INJECTION, SOLUTION INTRAVENOUS
Status: CANCELLED | OUTPATIENT
Start: 2019-12-18

## 2019-12-18 RX ORDER — ACETAMINOPHEN 325 MG/1
650 TABLET ORAL ONCE AS NEEDED
Status: CANCELLED | OUTPATIENT
Start: 2019-12-18

## 2019-12-18 RX ORDER — EPHEDRINE SULFATE 50 MG/ML
5 INJECTION, SOLUTION INTRAVENOUS ONCE AS NEEDED
Status: CANCELLED | OUTPATIENT
Start: 2019-12-18

## 2019-12-18 RX ORDER — PROMETHAZINE HYDROCHLORIDE 25 MG/1
25 TABLET ORAL ONCE AS NEEDED
Status: CANCELLED | OUTPATIENT
Start: 2019-12-18

## 2019-12-18 RX ORDER — PROMETHAZINE HYDROCHLORIDE 25 MG/ML
6.25 INJECTION, SOLUTION INTRAMUSCULAR; INTRAVENOUS
Status: CANCELLED | OUTPATIENT
Start: 2019-12-18

## 2019-12-18 RX ORDER — HYDRALAZINE HYDROCHLORIDE 20 MG/ML
5 INJECTION INTRAMUSCULAR; INTRAVENOUS
Status: CANCELLED | OUTPATIENT
Start: 2019-12-18

## 2019-12-18 RX ORDER — FLUMAZENIL 0.1 MG/ML
0.2 INJECTION INTRAVENOUS AS NEEDED
Status: CANCELLED | OUTPATIENT
Start: 2019-12-18

## 2019-12-18 RX ORDER — NALOXONE HCL 0.4 MG/ML
0.2 VIAL (ML) INJECTION AS NEEDED
Status: CANCELLED | OUTPATIENT
Start: 2019-12-18

## 2019-12-18 RX ORDER — LIDOCAINE HYDROCHLORIDE 20 MG/ML
INJECTION, SOLUTION INFILTRATION; PERINEURAL AS NEEDED
Status: DISCONTINUED | OUTPATIENT
Start: 2019-12-18 | End: 2019-12-18 | Stop reason: SURG

## 2019-12-18 RX ORDER — ONDANSETRON 2 MG/ML
4 INJECTION INTRAMUSCULAR; INTRAVENOUS ONCE AS NEEDED
Status: CANCELLED | OUTPATIENT
Start: 2019-12-18

## 2019-12-18 RX ORDER — MIDAZOLAM HYDROCHLORIDE 1 MG/ML
1 INJECTION INTRAMUSCULAR; INTRAVENOUS
Status: DISCONTINUED | OUTPATIENT
Start: 2019-12-18 | End: 2019-12-18 | Stop reason: HOSPADM

## 2019-12-18 RX ORDER — HYDROMORPHONE HYDROCHLORIDE 1 MG/ML
0.5 INJECTION, SOLUTION INTRAMUSCULAR; INTRAVENOUS; SUBCUTANEOUS
Status: CANCELLED | OUTPATIENT
Start: 2019-12-18

## 2019-12-18 RX ORDER — ROCURONIUM BROMIDE 10 MG/ML
INJECTION, SOLUTION INTRAVENOUS AS NEEDED
Status: DISCONTINUED | OUTPATIENT
Start: 2019-12-18 | End: 2019-12-18 | Stop reason: SURG

## 2019-12-18 RX ORDER — SODIUM CHLORIDE 0.9 % (FLUSH) 0.9 %
3-10 SYRINGE (ML) INJECTION AS NEEDED
Status: DISCONTINUED | OUTPATIENT
Start: 2019-12-18 | End: 2019-12-18 | Stop reason: HOSPADM

## 2019-12-18 RX ORDER — FENTANYL CITRATE 50 UG/ML
INJECTION, SOLUTION INTRAMUSCULAR; INTRAVENOUS AS NEEDED
Status: DISCONTINUED | OUTPATIENT
Start: 2019-12-18 | End: 2019-12-18 | Stop reason: SURG

## 2019-12-18 RX ORDER — FENTANYL CITRATE 50 UG/ML
INJECTION, SOLUTION INTRAMUSCULAR; INTRAVENOUS
Status: COMPLETED
Start: 2019-12-18 | End: 2019-12-18

## 2019-12-18 RX ORDER — MIDAZOLAM HYDROCHLORIDE 1 MG/ML
2 INJECTION INTRAMUSCULAR; INTRAVENOUS
Status: DISCONTINUED | OUTPATIENT
Start: 2019-12-18 | End: 2019-12-18 | Stop reason: HOSPADM

## 2019-12-18 RX ORDER — FENTANYL CITRATE 50 UG/ML
50 INJECTION, SOLUTION INTRAMUSCULAR; INTRAVENOUS
Status: CANCELLED | OUTPATIENT
Start: 2019-12-18

## 2019-12-18 RX ORDER — DIPHENHYDRAMINE HYDROCHLORIDE 50 MG/ML
12.5 INJECTION INTRAMUSCULAR; INTRAVENOUS
Status: CANCELLED | OUTPATIENT
Start: 2019-12-18

## 2019-12-18 RX ORDER — FAMOTIDINE 10 MG/ML
20 INJECTION, SOLUTION INTRAVENOUS ONCE
Status: COMPLETED | OUTPATIENT
Start: 2019-12-18 | End: 2019-12-18

## 2019-12-18 RX ORDER — HYDROCODONE BITARTRATE AND ACETAMINOPHEN 7.5; 325 MG/1; MG/1
1 TABLET ORAL ONCE AS NEEDED
Status: CANCELLED | OUTPATIENT
Start: 2019-12-18

## 2019-12-18 RX ORDER — DIPHENHYDRAMINE HCL 25 MG
25 CAPSULE ORAL
Status: CANCELLED | OUTPATIENT
Start: 2019-12-18

## 2019-12-18 RX ADMIN — MIDAZOLAM 2 MG: 1 INJECTION INTRAMUSCULAR; INTRAVENOUS at 01:25

## 2019-12-18 RX ADMIN — SODIUM CHLORIDE, POTASSIUM CHLORIDE, SODIUM LACTATE AND CALCIUM CHLORIDE 9 ML/HR: 600; 310; 30; 20 INJECTION, SOLUTION INTRAVENOUS at 01:11

## 2019-12-18 RX ADMIN — LIDOCAINE HYDROCHLORIDE 100 MG: 20 INJECTION, SOLUTION INFILTRATION; PERINEURAL at 01:34

## 2019-12-18 RX ADMIN — FENTANYL CITRATE 100 MCG: 50 INJECTION INTRAMUSCULAR; INTRAVENOUS at 01:33

## 2019-12-18 RX ADMIN — PROPOFOL 200 MG: 10 INJECTION, EMULSION INTRAVENOUS at 01:34

## 2019-12-18 RX ADMIN — SUCCINYLCHOLINE CHLORIDE 120 MG: 20 INJECTION, SOLUTION INTRAMUSCULAR; INTRAVENOUS; PARENTERAL at 01:35

## 2019-12-18 RX ADMIN — FAMOTIDINE 20 MG: 10 INJECTION, SOLUTION INTRAVENOUS at 01:11

## 2019-12-18 RX ADMIN — ROCURONIUM BROMIDE 5 MG: 10 INJECTION INTRAVENOUS at 01:33

## 2019-12-18 RX ADMIN — GLYCOPYRROLATE 0.1 MG: 0.2 INJECTION INTRAMUSCULAR; INTRAVENOUS at 01:33

## 2019-12-18 NOTE — ED PROVIDER NOTES
" EMERGENCY DEPARTMENT ENCOUNTER    CHIEF COMPLAINT  Chief Complaint: \"food bolus\"  History given by: patient  History limited by: nothing  Room Number: [facility] OR/MAIN OR  PMD: Provider, No Known      HPI:  Pt is a 39 y.o. male with history of food bolus who presents to the ED via private vehicle complaining of sudden onset, constant, worsening, \"food bolus\" after eating brats for dinner 3.5 hours ago. Pt has had no relief with \"moving around or drinking ginger ale, I can't get it down no matter what I do.\" Also complains of difficulty handling oral secretions, but denies any other complaints.    MEDICAL RECORD REVIEW    Pt was seen in 2015 and 2016 for esophageal food bolus. In 2016, he was given Glucagon with relief.    PAST MEDICAL HISTORY  Active Ambulatory Problems     Diagnosis Date Noted   • Alcohol dependence with withdrawal (CMS/[facility]) 10/02/2019   • Tobacco use disorder 10/02/2019   • Hydrocodone use disorder, mild (CMS/[facility]) 10/02/2019   • ADHD 10/07/2019     Resolved Ambulatory Problems     Diagnosis Date Noted   • No Resolved Ambulatory Problems     Past Medical History:   Diagnosis Date   • ADHD (attention deficit hyperactivity disorder)    • Alcoholism (CMS/[facility])        PAST SURGICAL HISTORY  Past Surgical History:   Procedure Laterality Date   • NO PAST SURGERIES         FAMILY HISTORY  Family History   Problem Relation Age of Onset   • Alcohol abuse Father        SOCIAL HISTORY  Social History     Socioeconomic History   • Marital status: Single     Spouse name: Not on file   • Number of children: Not on file   • Years of education: Not on file   • Highest education level: Not on file   Tobacco Use   • Smoking status: Current Every Day Smoker     Packs/day: 1.00   • Smokeless tobacco: Never Used   Substance and Sexual Activity   • Alcohol use: Yes     Comment: see below   • Drug use: No     Comment: too k some pain pills in past 30 days, not a lot and  only on occ   • Sexual activity: Defer " "      ALLERGIES  Patient has no known allergies.    REVIEW OF SYSTEMS  Review of Systems   Constitutional: Negative for activity change, appetite change and fever.   HENT: Negative for congestion and sore throat.         (+) \"food bolus\"  (+) difficulty handling oral secretions   Eyes: Negative.    Respiratory: Negative for cough and shortness of breath.    Cardiovascular: Negative for chest pain and leg swelling.   Gastrointestinal: Negative for abdominal pain, diarrhea and vomiting.   Endocrine: Negative.    Genitourinary: Negative for decreased urine volume and dysuria.   Musculoskeletal: Negative for neck pain.   Skin: Negative for rash and wound.   Allergic/Immunologic: Negative.    Neurological: Negative for weakness, numbness and headaches.   Hematological: Negative.    Psychiatric/Behavioral: Negative.    All other systems reviewed and are negative.    All systems reviewed and negative except for those discussed in HPI.    PHYSICAL EXAM  ED Triage Vitals [12/17/19 2249]   Temp Heart Rate Resp BP SpO2   97.6 °F (36.4 °C) 96 17 -- 97 %      Temp src Heart Rate Source Patient Position BP Location FiO2 (%)   Tympanic Monitor -- -- --       Physical Exam   Constitutional: He is oriented to person, place, and time. No distress.   HENT:   Head: Normocephalic and atraumatic.   Eyes: Pupils are equal, round, and reactive to light. EOM are normal.   Neck: Normal range of motion. Neck supple.   Cardiovascular: Normal rate, regular rhythm and normal heart sounds.   Pulmonary/Chest: Effort normal and breath sounds normal. No respiratory distress.   Abdominal: Soft. There is no tenderness. There is no rebound and no guarding.   Musculoskeletal: Normal range of motion. He exhibits no edema.   Neurological: He is alert and oriented to person, place, and time. He has normal sensation and normal strength.   Skin: Skin is warm and dry.   Psychiatric: Mood and affect normal.   Nursing note and vitals reviewed.      PROGRESS AND " "CONSULTS       2304- Discussed with pt plan to treat bolus with glucagon. Pt states he does not want us to consult GI if avoidable. Pt understands and agrees with the plan, all questions answered. Ordered IVF, Glucagon, and Ativan for symptom management.    0014- Rechecked pt. Pt is resting comfortably, and feels that food in his throat might be \"going down.\" Pt failed swallow test at bedside, and vomited water immediately after. Discussed the plan to consult GI. Pt understands and agrees with the plan, all questions answered. Placed call out to GI for consult.    0027- Talked to Dr. Padilla GI, who agrees to admit pt for endoscopy. They agree with plan of care.    MEDICAL DECISION MAKING  Results were reviewed/discussed with the patient and they were also made aware of online access. Pt also made aware that some labs, such as cultures, will not be resulted during ER visit and follow up with PMD is necessary.          DIAGNOSIS  Final diagnoses:   Esophageal obstruction due to food impaction       DISPOSITION  ADMISSION    Discussed treatment plan and reason for admission with pt/family and admitting physician.  Pt/family voiced understanding of the plan for admission for further testing/treatment as needed.         Latest Documented Vital Signs:  As of 3:03 AM  BP- 114/94 HR- 82 Temp- 98 °F (36.7 °C) (Oral) O2 sat- 95%    --  Documentation assistance provided by duong Edmonds for Dr. Car.  Information recorded by the scribe was done at my direction and has been verified and validated by me.     Nedra Edmonds  12/18/19 0045       Lve Car MD  12/18/19 4917    "

## 2019-12-18 NOTE — H&P
Patient Care Team:  Provider, No Known as PCP - General    Chief complaint esophageal obstruction    Subjective     History of Present Illness  Known eosinophillic esophagitis,  Was eating bratworst  And had get it get stuck.  He tried ginger ale to no effect. And cant handle secretions. The ER doctor tried glucagon to no avail . This happenened about 5 hours ago,  Review of Systems   HENT: Positive for trouble swallowing.         Past Medical History:   Diagnosis Date   • ADHD (attention deficit hyperactivity disorder)    • Alcoholism (CMS/HCC)      Past Surgical History:   Procedure Laterality Date   • NO PAST SURGERIES       Family History   Problem Relation Age of Onset   • Alcohol abuse Father      Social History     Tobacco Use   • Smoking status: Current Every Day Smoker     Packs/day: 1.00   • Smokeless tobacco: Never Used   Substance Use Topics   • Alcohol use: Yes     Comment: see below   • Drug use: No     Comment: too k some pain pills in past 30 days, not a lot and  only on occ     Medications Prior to Admission   Medication Sig Dispense Refill Last Dose   • varenicline (CHANTIX CONTINUING MONTH FELICITA) 1 MG tablet Take as directed. 56 tablet 0    • varenicline (CHANTIX) 0.5 MG tablet Take 1 tablet by mouth 2 (Two) Times a Day With Meals. 5 tablet 0      Allergies:  Patient has no known allergies.    Objective      Vital Signs  Temp:  [97.6 °F (36.4 °C)-98 °F (36.7 °C)] 98 °F (36.7 °C)  Heart Rate:  [81-96] 82  Resp:  [17-18] 18  BP: (113-114)/(77-94) 114/94    Physical Exam   Constitutional: He is oriented to person, place, and time. He appears well-developed and well-nourished.   HENT:   Mouth/Throat: Oropharynx is clear and moist.   Eyes: Conjunctivae are normal.   Cardiovascular: Normal rate and regular rhythm.   Pulmonary/Chest: Effort normal and breath sounds normal.   Abdominal: Soft. Bowel sounds are normal.   Neurological: He is alert and oriented to person, place, and time.   Skin: Skin is  warm and dry.   Psychiatric: He has a normal mood and affect.       Results Review:   I reviewed the patient's new clinical results.      Assessment/Plan       * No active hospital problems. *      Assessment:  (Esophageal obstruction  eosinophillic esophagitis).     Plan:   (Emergent upper endoscopy with removal of food bolus and possible dilation  Risks, alternatives and benefits reviewed and patient is agreeable to proceed).       I discussed the patients findings and my recommendations with patient and nursing staff    Tariq Padilla MD  12/18/19  1:25 AM

## 2019-12-18 NOTE — ED NOTES
Pt reports he was eating brats around 3 hours ago when he got a piece stuck in his throat and has been having increasing pain since. Pt in no acute respiratory distress at this time and is not having any drooling.      Jus Rivas, RN  12/18/19 0102

## 2019-12-18 NOTE — ANESTHESIA PREPROCEDURE EVALUATION
" Anesthesia Evaluation     Patient summary reviewed and Nursing notes reviewed     NPO Liquid Status: Waived due to emergency           Airway   Mallampati: II  TM distance: >3 FB  Neck ROM: full  No difficulty expected  Dental      Pulmonary - normal exam   (+) a smoker Current Smoked day of surgery,   Cardiovascular - normal exam  Exercise tolerance: good (4-7 METS)        Neuro/Psych  (+) psychiatric history ADHD,     GI/Hepatic/Renal/Endo      Musculoskeletal     Abdominal  - normal exam   Substance History   (+) alcohol use, drug use (abuses oral pain pills occasionally\")     OB/GYN          Other                        Anesthesia Plan    ASA 2 - emergent     general   Rapid sequence  intravenous induction     Anesthetic plan, all risks, benefits, and alternatives have been provided, discussed and informed consent has been obtained with: patient.    Plan discussed with CRNA.      "

## 2019-12-18 NOTE — ANESTHESIA PROCEDURE NOTES
Airway  Urgency: elective    Date/Time: 12/18/2019 1:36 AM  Airway not difficult    General Information and Staff    Patient location during procedure: OR  Anesthesiologist: Veena Mcnamara MD    Indications and Patient Condition  Indications for airway management: airway protection    Preoxygenated: yes  Mask difficulty assessment: 1 - vent by mask    Final Airway Details  Final airway type: endotracheal airway      Successful airway: ETT  Cuffed: yes   Successful intubation technique: direct laryngoscopy  Endotracheal tube insertion site: oral  Blade: Tita  ETT size (mm): 7.5  Cormack-Lehane Classification: grade IIa - partial view of glottis  Placement verified by: chest auscultation   Inital cuff pressure (cm H2O): 19  Cuff volume (mL): 5  Measured from: teeth  ETT/EBT  to teeth (cm): 20  Number of attempts at approach: 1  Assessment: lips, teeth, and gum same as pre-op and atraumatic intubation

## 2019-12-18 NOTE — ANESTHESIA POSTPROCEDURE EVALUATION
Patient: Morteza Judd    Procedure Summary     Date:  12/18/19 Room / Location:  [facility] OR  / [facility] MAIN OR    Anesthesia Start:  0130 Anesthesia Stop:  0216    Procedure:  ESOPHAGOGASTRODUODENOSCOPY WITH REMOVAL OF FOREIGN BODY (N/A Esophagus) Diagnosis:      Surgeon:  Tariq Padilla MD Provider:  Veena Mcnamara MD    Anesthesia Type:  general ASA Status:  2 - Emergent          Anesthesia Type: general    Vitals  No vitals data found for the desired time range.          Post Anesthesia Care and Evaluation    Patient location during evaluation: bedside  Patient participation: complete - patient participated  Level of consciousness: awake and alert  Pain management: adequate  Airway patency: patent  Anesthetic complications: No anesthetic complications  PONV Status: controlled  Cardiovascular status: acceptable  Respiratory status: acceptable  Hydration status: acceptable

## 2020-01-07 ENCOUNTER — APPOINTMENT (OUTPATIENT)
Dept: GENERAL RADIOLOGY | Facility: HOSPITAL | Age: 40
End: 2020-01-07

## 2020-01-07 ENCOUNTER — HOSPITAL ENCOUNTER (EMERGENCY)
Facility: HOSPITAL | Age: 40
Discharge: HOME | End: 2020-01-07
Attending: EMERGENCY MEDICINE | Admitting: EMERGENCY MEDICINE

## 2020-01-07 VITALS
HEIGHT: 67 IN | OXYGEN SATURATION: 97 % | TEMPERATURE: 96.8 F | HEART RATE: 85 BPM | BODY MASS INDEX: 25.9 KG/M2 | WEIGHT: 165 LBS | SYSTOLIC BLOOD PRESSURE: 137 MMHG | RESPIRATION RATE: 18 BRPM | DIASTOLIC BLOOD PRESSURE: 80 MMHG

## 2020-01-07 DIAGNOSIS — T18.128A ESOPHAGEAL OBSTRUCTION DUE TO FOOD IMPACTION: Primary | ICD-10-CM

## 2020-01-07 DIAGNOSIS — K22.2 ESOPHAGEAL OBSTRUCTION DUE TO FOOD IMPACTION: Primary | ICD-10-CM

## 2020-01-07 LAB
ANION GAP SERPL CALCULATED.3IONS-SCNC: 14.5 MMOL/L (ref 5–15)
BASOPHILS # BLD AUTO: 0.03 10*3/MM3 (ref 0–0.2)
BASOPHILS NFR BLD AUTO: 0.4 % (ref 0–1.5)
BUN BLD-MCNC: 18 MG/DL (ref 6–20)
BUN/CREAT SERPL: 18 (ref 7–25)
CALCIUM SPEC-SCNC: 9.6 MG/DL (ref 8.6–10.5)
CHLORIDE SERPL-SCNC: 100 MMOL/L (ref 98–107)
CO2 SERPL-SCNC: 23.5 MMOL/L (ref 22–29)
CREAT BLD-MCNC: 1 MG/DL (ref 0.76–1.27)
DEPRECATED RDW RBC AUTO: 44.2 FL (ref 37–54)
EOSINOPHIL # BLD AUTO: 0.57 10*3/MM3 (ref 0–0.4)
EOSINOPHIL NFR BLD AUTO: 7.1 % (ref 0.3–6.2)
ERYTHROCYTE [DISTWIDTH] IN BLOOD BY AUTOMATED COUNT: 13 % (ref 12.3–15.4)
GFR SERPL CREATININE-BSD FRML MDRD: 83 ML/MIN/1.73
GLUCOSE BLD-MCNC: 96 MG/DL (ref 65–99)
HCT VFR BLD AUTO: 47.4 % (ref 37.5–51)
HGB BLD-MCNC: 16.2 G/DL (ref 13–17.7)
IMM GRANULOCYTES # BLD AUTO: 0.03 10*3/MM3 (ref 0–0.05)
IMM GRANULOCYTES NFR BLD AUTO: 0.4 % (ref 0–0.5)
LYMPHOCYTES # BLD AUTO: 3.38 10*3/MM3 (ref 0.7–3.1)
LYMPHOCYTES NFR BLD AUTO: 42 % (ref 19.6–45.3)
MCH RBC QN AUTO: 31.6 PG (ref 26.6–33)
MCHC RBC AUTO-ENTMCNC: 34.2 G/DL (ref 31.5–35.7)
MCV RBC AUTO: 92.4 FL (ref 79–97)
MONOCYTES # BLD AUTO: 0.91 10*3/MM3 (ref 0.1–0.9)
MONOCYTES NFR BLD AUTO: 11.3 % (ref 5–12)
NEUTROPHILS # BLD AUTO: 3.13 10*3/MM3 (ref 1.7–7)
NEUTROPHILS NFR BLD AUTO: 38.8 % (ref 42.7–76)
NRBC BLD AUTO-RTO: 0 /100 WBC (ref 0–0.2)
PLATELET # BLD AUTO: 231 10*3/MM3 (ref 140–450)
PMV BLD AUTO: 9 FL (ref 6–12)
POTASSIUM BLD-SCNC: 3.6 MMOL/L (ref 3.5–5.2)
RBC # BLD AUTO: 5.13 10*6/MM3 (ref 4.14–5.8)
SODIUM BLD-SCNC: 138 MMOL/L (ref 136–145)
WBC NRBC COR # BLD: 8.05 10*3/MM3 (ref 3.4–10.8)

## 2020-01-07 PROCEDURE — 85025 COMPLETE CBC W/AUTO DIFF WBC: CPT | Performed by: EMERGENCY MEDICINE

## 2020-01-07 PROCEDURE — 99283 EMERGENCY DEPT VISIT LOW MDM: CPT

## 2020-01-07 PROCEDURE — 80048 BASIC METABOLIC PNL TOTAL CA: CPT | Performed by: EMERGENCY MEDICINE

## 2020-01-07 PROCEDURE — 71045 X-RAY EXAM CHEST 1 VIEW: CPT

## 2020-01-07 RX ORDER — SODIUM CHLORIDE 9 MG/ML
125 INJECTION, SOLUTION INTRAVENOUS CONTINUOUS
Status: DISCONTINUED | OUTPATIENT
Start: 2020-01-07 | End: 2020-01-07

## 2020-01-07 RX ORDER — SODIUM CHLORIDE 0.9 % (FLUSH) 0.9 %
10 SYRINGE (ML) INJECTION AS NEEDED
Status: DISCONTINUED | OUTPATIENT
Start: 2020-01-07 | End: 2020-01-08 | Stop reason: HOSPADM

## 2020-01-07 RX ADMIN — SODIUM CHLORIDE 500 ML: 9 INJECTION, SOLUTION INTRAVENOUS at 23:15

## 2020-01-08 NOTE — ED NOTES
Pt states that he believes the food bolus has cleared. Pt states symptoms have cleared. Pt given water and crackers per Dr. Robles.      Quentin Deluna, RN  01/07/20 [id number]

## 2020-01-08 NOTE — ED PROVIDER NOTES
" EMERGENCY DEPARTMENT ENCOUNTER    Room Number:  02/02  Date of encounter:  1/8/2020  PCP: Provider, No Known  Historian: Pt      HPI:  Chief Complaint: Food bolus  Context: Morteza Judd is a 39 y.o. male who presents to the ED c/o constant food bolus for the past 2 hours since having sukh robles earlier tonight.  He states that he is \"unable to get anything down and he cannot swallow liquids.\"  He has had numerous food boluses in the past and has been told that he has narrow esophagus.      MEDICAL HISTORY REVIEW  12/17/2019.  Pt was seen by Dr. aTriq Padilla for esophageal food bolus and eosinophilic esophagitis.  Dr. Padilla successfully removed the foreign body.      PAST MEDICAL HISTORY  Active Ambulatory Problems     Diagnosis Date Noted   • Alcohol dependence with withdrawal (CMS/[facility]) 10/02/2019   • Tobacco use disorder 10/02/2019   • Hydrocodone use disorder, mild (CMS/[facility]) 10/02/2019   • ADHD 10/07/2019     Resolved Ambulatory Problems     Diagnosis Date Noted   • No Resolved Ambulatory Problems     Past Medical History:   Diagnosis Date   • ADHD (attention deficit hyperactivity disorder)    • Alcoholism (CMS/[facility])          PAST SURGICAL HISTORY  Past Surgical History:   Procedure Laterality Date   • ENDOSCOPY N/A 12/18/2019    Procedure: ESOPHAGOGASTRODUODENOSCOPY WITH REMOVAL OF FOREIGN BODY;  Surgeon: Tariq Padilla MD;  Location: [facility];  Service: Gastroenterology   • NO PAST SURGERIES           FAMILY HISTORY  Family History   Problem Relation Age of Onset   • Alcohol abuse Father          SOCIAL HISTORY  Social History     Socioeconomic History   • Marital status: Single     Spouse name: Not on file   • Number of children: Not on file   • Years of education: Not on file   • Highest education level: Not on file   Tobacco Use   • Smoking status: Current Every Day Smoker     Packs/day: 1.00   • Smokeless tobacco: Never Used   Substance and Sexual Activity   • Alcohol use: Yes     Comment: see below "   • Drug use: No     Comment: too k some pain pills in past 30 days, not a lot and  only on occ   • Sexual activity: Defer         ALLERGIES  Patient has no known allergies.        REVIEW OF SYSTEMS  Review of Systems   HENT:        Positive food bolus        All systems reviewed and negative except for those discussed in HPI.       PHYSICAL EXAM    I have reviewed the triage vital signs and nursing notes.    ED Triage Vitals   Temp Heart Rate Resp BP SpO2   01/07/20 2227 01/07/20 2227 01/07/20 2227 01/07/20 2240 01/07/20 2227   96.8 °F (36 °C) 102 18 (!) 133/105 97 %      Temp src Heart Rate Source Patient Position BP Location FiO2 (%)   -- 01/07/20 2240 01/07/20 2240 01/07/20 2240 --    Monitor Sitting Left arm        GENERAL: mildly distressed  HENT: nares patent.  Unable to tolerate his own saliva.  Gave him something to drink and he was unable to get it down.    EYES: no scleral icterus  CV: regular rhythm, regular rate  RESPIRATORY: normal effort  ABDOMEN: soft  MUSCULOSKELETAL: no deformity  NEURO: alert, moves all extremities, follows commands  SKIN: warm, dry        LAB RESULTS  Recent Results (from the past 24 hour(s))   Basic Metabolic Panel    Collection Time: 01/07/20 11:15 PM   Result Value Ref Range    Glucose 96 65 - 99 mg/dL    BUN 18 6 - 20 mg/dL    Creatinine 1.00 0.76 - 1.27 mg/dL    Sodium 138 136 - 145 mmol/L    Potassium 3.6 3.5 - 5.2 mmol/L    Chloride 100 98 - 107 mmol/L    CO2 23.5 22.0 - 29.0 mmol/L    Calcium 9.6 8.6 - 10.5 mg/dL    eGFR Non African Amer 83 >60 mL/min/1.73    BUN/Creatinine Ratio 18.0 7.0 - 25.0    Anion Gap 14.5 5.0 - 15.0 mmol/L   CBC Auto Differential    Collection Time: 01/07/20 11:15 PM   Result Value Ref Range    WBC 8.05 3.40 - 10.80 10*3/mm3    RBC 5.13 4.14 - 5.80 10*6/mm3    Hemoglobin 16.2 13.0 - 17.7 g/dL    Hematocrit 47.4 37.5 - 51.0 %    MCV 92.4 79.0 - 97.0 fL    MCH 31.6 26.6 - 33.0 pg    MCHC 34.2 31.5 - 35.7 g/dL    RDW 13.0 12.3 - 15.4 %    RDW-SD 44.2  37.0 - 54.0 fl    MPV 9.0 6.0 - 12.0 fL    Platelets 231 140 - 450 10*3/mm3    Neutrophil % 38.8 (L) 42.7 - 76.0 %    Lymphocyte % 42.0 19.6 - 45.3 %    Monocyte % 11.3 5.0 - 12.0 %    Eosinophil % 7.1 (H) 0.3 - 6.2 %    Basophil % 0.4 0.0 - 1.5 %    Immature Grans % 0.4 0.0 - 0.5 %    Neutrophils, Absolute 3.13 1.70 - 7.00 10*3/mm3    Lymphocytes, Absolute 3.38 (H) 0.70 - 3.10 10*3/mm3    Monocytes, Absolute 0.91 (H) 0.10 - 0.90 10*3/mm3    Eosinophils, Absolute 0.57 (H) 0.00 - 0.40 10*3/mm3    Basophils, Absolute 0.03 0.00 - 0.20 10*3/mm3    Immature Grans, Absolute 0.03 0.00 - 0.05 10*3/mm3    nRBC 0.0 0.0 - 0.2 /100 WBC       Ordered the above labs and independently reviewed the results.        RADIOLOGY  Xr Chest 1 View    Result Date: 1/7/2020  PORTABLE CHEST  CLINICAL HISTORY:  esophageal food bolus; K22.2-Esophageal obstruction; T18.128A-Food in esophagus causing other injury, initial encounter  COMPARISON:  None.  FINDINGS:  Single portable view of the chest obtained.  The lungs are well expanded and clear.  Cardiac size is within normal limits. Vascularity is normal considering technique.  No pleural fluid is demonstrated by portable imaging.          No active disease by portable imaging.         I ordered the above noted radiological studies. Reviewed by me and discussed with radiologist.  See dictation for official radiology interpretation.      PROCEDURES    Procedures      MEDICATIONS GIVEN IN ER    Medications   sodium chloride 0.9 % flush 10 mL (has no administration in time range)   sodium chloride 0.9 % bolus 500 mL (0 mL Intravenous Stopped 1/7/20 2341)         PROGRESS, DATA ANALYSIS, CONSULTS, AND MEDICAL DECISION MAKING    2302: Spoke with Dr. Stout (GI) who would like pt to be admitted to Dr. Padilla because pt was seen by Dr. Padilla on 12/17/2019.      All labs have been independently reviewed by me.  All radiology studies have been reviewed by me and discussed with radiologist dictating the  report.   EKG's independently viewed and interpreted by me.  Discussion below represents my analysis of pertinent findings related to patient's condition, differential diagnosis, treatment plan and final disposition.      ED Course as of Jan 08 0006 Tue Jan 07, 2020   2340 Patient's food bolus worked its way down spontaneously.  Patient is tolerating water.  Has had 2 cups of water and is also ate crackers without any difficulty.  Patient symptoms of 100% resolved.  I spoke with him about the necessity of starting back on his Protonix as well as using liquid diet and make certain that he eats small bites of food that he chews up very well before swallowing.  Patient understands that plan.  Patient is ready to go home all questions were answered and agrees with this plan.  I also informed him that he needs to follow-up with Dr. rios.    [MM]      ED Course User Index  [MM] Danny Robles MD       AS OF 12:06 AM VITALS:    BP - 137/80  HR - 85  TEMP - 96.8 °F (36 °C)  O2 SATS - 97%        DIAGNOSIS  Final diagnoses:   Esophageal obstruction due to food impaction: Resolved spontaneously         DISPOSITION  ADMISSION    Discussed treatment plan and reason for admission with pt/family and admitting physician.  Pt/family voiced understanding of the plan for admission for further testing/treatment as needed.         Documentation assistance provided by duong Diaz for Dr. Margaret MD.  Information recorded by the scribe was done at my direction and has been verified and validated by me.       William Diaz  01/07/20 [id number]       Danny Robles MD  01/08/20 0006

## 2020-01-08 NOTE — DISCHARGE INSTRUCTIONS
As we discussed, make certain that you drink plenty of fluids when you eat, take small bites of food, chew up the food very well before swallowing.  Take the Protonix as previously prescribed.

## 2020-01-08 NOTE — ED NOTES
CALLED THE DR ON CALL WHICH WAS DR MENESES AND SHE WANTED ME TO CALL DR CAIN CAUSE HE DONE A SCOPE ON THIS PT ON DEC 18TH. PLACED A CALL OUT FOR Nora Teixeira  01/07/20 [id number]       Nora Amezquita  01/07/20 [id number]

## 2020-05-18 ENCOUNTER — APPOINTMENT (OUTPATIENT)
Dept: CT IMAGING | Facility: HOSPITAL | Age: 40
End: 2020-05-18

## 2020-05-18 ENCOUNTER — NURSE TRIAGE (OUTPATIENT)
Dept: CALL CENTER | Facility: HOSPITAL | Age: 40
End: 2020-05-18

## 2020-05-18 ENCOUNTER — HOSPITAL ENCOUNTER (EMERGENCY)
Facility: HOSPITAL | Age: 40
Discharge: HOME | End: 2020-05-18
Attending: EMERGENCY MEDICINE | Admitting: EMERGENCY MEDICINE

## 2020-05-18 VITALS
SYSTOLIC BLOOD PRESSURE: 165 MMHG | TEMPERATURE: 97.6 F | HEIGHT: 67 IN | OXYGEN SATURATION: 99 % | BODY MASS INDEX: 25.11 KG/M2 | WEIGHT: 160 LBS | RESPIRATION RATE: 16 BRPM | HEART RATE: 81 BPM | DIASTOLIC BLOOD PRESSURE: 100 MMHG

## 2020-05-18 DIAGNOSIS — E86.0 DEHYDRATION: ICD-10-CM

## 2020-05-18 DIAGNOSIS — K52.9 GASTROENTERITIS: Primary | ICD-10-CM

## 2020-05-18 DIAGNOSIS — N28.1 RENAL CYST, RIGHT: ICD-10-CM

## 2020-05-18 LAB
ALBUMIN SERPL-MCNC: 4.9 G/DL (ref 3.5–5.2)
ALBUMIN/GLOB SERPL: 1.5 G/DL
ALP SERPL-CCNC: 78 U/L (ref 39–117)
ALT SERPL W P-5'-P-CCNC: 79 U/L (ref 1–41)
ANION GAP SERPL CALCULATED.3IONS-SCNC: 15.7 MMOL/L (ref 5–15)
AST SERPL-CCNC: 95 U/L (ref 1–40)
BACTERIA UR QL AUTO: NORMAL /HPF
BASOPHILS # BLD AUTO: 0.04 10*3/MM3 (ref 0–0.2)
BASOPHILS NFR BLD AUTO: 0.4 % (ref 0–1.5)
BILIRUB SERPL-MCNC: 1.8 MG/DL (ref 0.2–1.2)
BILIRUB UR QL STRIP: NEGATIVE
BUN BLD-MCNC: 20 MG/DL (ref 6–20)
BUN/CREAT SERPL: 19.2 (ref 7–25)
CALCIUM SPEC-SCNC: 9.3 MG/DL (ref 8.6–10.5)
CHLORIDE SERPL-SCNC: 95 MMOL/L (ref 98–107)
CLARITY UR: CLEAR
CO2 SERPL-SCNC: 24.3 MMOL/L (ref 22–29)
COLOR UR: YELLOW
CREAT BLD-MCNC: 1.04 MG/DL (ref 0.76–1.27)
DEPRECATED RDW RBC AUTO: 40.9 FL (ref 37–54)
EOSINOPHIL # BLD AUTO: 0.03 10*3/MM3 (ref 0–0.4)
EOSINOPHIL NFR BLD AUTO: 0.3 % (ref 0.3–6.2)
ERYTHROCYTE [DISTWIDTH] IN BLOOD BY AUTOMATED COUNT: 12.6 % (ref 12.3–15.4)
GFR SERPL CREATININE-BSD FRML MDRD: 79 ML/MIN/1.73
GLOBULIN UR ELPH-MCNC: 3.3 GM/DL
GLUCOSE BLD-MCNC: 106 MG/DL (ref 65–99)
GLUCOSE UR STRIP-MCNC: NEGATIVE MG/DL
HCT VFR BLD AUTO: 51.2 % (ref 37.5–51)
HGB BLD-MCNC: 18.1 G/DL (ref 13–17.7)
HGB UR QL STRIP.AUTO: NEGATIVE
HYALINE CASTS UR QL AUTO: NORMAL /LPF
IMM GRANULOCYTES # BLD AUTO: 0.02 10*3/MM3 (ref 0–0.05)
IMM GRANULOCYTES NFR BLD AUTO: 0.2 % (ref 0–0.5)
KETONES UR QL STRIP: NEGATIVE
LEUKOCYTE ESTERASE UR QL STRIP.AUTO: NEGATIVE
LIPASE SERPL-CCNC: 75 U/L (ref 13–60)
LYMPHOCYTES # BLD AUTO: 2.49 10*3/MM3 (ref 0.7–3.1)
LYMPHOCYTES NFR BLD AUTO: 25.3 % (ref 19.6–45.3)
MCH RBC QN AUTO: 31.9 PG (ref 26.6–33)
MCHC RBC AUTO-ENTMCNC: 35.4 G/DL (ref 31.5–35.7)
MCV RBC AUTO: 90.1 FL (ref 79–97)
MONOCYTES # BLD AUTO: 0.9 10*3/MM3 (ref 0.1–0.9)
MONOCYTES NFR BLD AUTO: 9.1 % (ref 5–12)
NEUTROPHILS # BLD AUTO: 6.37 10*3/MM3 (ref 1.7–7)
NEUTROPHILS NFR BLD AUTO: 64.7 % (ref 42.7–76)
NITRITE UR QL STRIP: NEGATIVE
NRBC BLD AUTO-RTO: 0 /100 WBC (ref 0–0.2)
PH UR STRIP.AUTO: <=5 [PH] (ref 5–8)
PLATELET # BLD AUTO: 242 10*3/MM3 (ref 140–450)
PMV BLD AUTO: 8.8 FL (ref 6–12)
POTASSIUM BLD-SCNC: 4 MMOL/L (ref 3.5–5.2)
PROT SERPL-MCNC: 8.2 G/DL (ref 6–8.5)
PROT UR QL STRIP: ABNORMAL
RBC # BLD AUTO: 5.68 10*6/MM3 (ref 4.14–5.8)
RBC # UR: NORMAL /HPF
REF LAB TEST METHOD: NORMAL
SODIUM BLD-SCNC: 135 MMOL/L (ref 136–145)
SP GR UR STRIP: >=1.03 (ref 1–1.03)
SQUAMOUS #/AREA URNS HPF: NORMAL /HPF
UROBILINOGEN UR QL STRIP: ABNORMAL
WBC NRBC COR # BLD: 9.85 10*3/MM3 (ref 3.4–10.8)
WBC UR QL AUTO: NORMAL /HPF

## 2020-05-18 PROCEDURE — 81001 URINALYSIS AUTO W/SCOPE: CPT | Performed by: EMERGENCY MEDICINE

## 2020-05-18 PROCEDURE — 25010000002 MORPHINE PER 10 MG: Performed by: EMERGENCY MEDICINE

## 2020-05-18 PROCEDURE — 25010000002 IOPAMIDOL 61 % SOLUTION: Performed by: EMERGENCY MEDICINE

## 2020-05-18 PROCEDURE — 80053 COMPREHEN METABOLIC PANEL: CPT | Performed by: EMERGENCY MEDICINE

## 2020-05-18 PROCEDURE — 96375 TX/PRO/DX INJ NEW DRUG ADDON: CPT

## 2020-05-18 PROCEDURE — 99283 EMERGENCY DEPT VISIT LOW MDM: CPT

## 2020-05-18 PROCEDURE — 96374 THER/PROPH/DIAG INJ IV PUSH: CPT

## 2020-05-18 PROCEDURE — 25010000002 ONDANSETRON PER 1 MG: Performed by: EMERGENCY MEDICINE

## 2020-05-18 PROCEDURE — 85025 COMPLETE CBC W/AUTO DIFF WBC: CPT | Performed by: EMERGENCY MEDICINE

## 2020-05-18 PROCEDURE — 83690 ASSAY OF LIPASE: CPT | Performed by: EMERGENCY MEDICINE

## 2020-05-18 PROCEDURE — 74177 CT ABD & PELVIS W/CONTRAST: CPT

## 2020-05-18 RX ORDER — MORPHINE SULFATE 2 MG/ML
4 INJECTION, SOLUTION INTRAMUSCULAR; INTRAVENOUS ONCE
Status: COMPLETED | OUTPATIENT
Start: 2020-05-18 | End: 2020-05-18

## 2020-05-18 RX ORDER — BUPROPION HYDROCHLORIDE 100 MG/1
300 TABLET ORAL DAILY
COMMUNITY

## 2020-05-18 RX ORDER — ONDANSETRON 2 MG/ML
4 INJECTION INTRAMUSCULAR; INTRAVENOUS ONCE
Status: COMPLETED | OUTPATIENT
Start: 2020-05-18 | End: 2020-05-18

## 2020-05-18 RX ORDER — DICYCLOMINE HCL 20 MG
20 TABLET ORAL EVERY 6 HOURS PRN
Qty: 20 TABLET | Refills: 0 | Status: SHIPPED | OUTPATIENT
Start: 2020-05-18

## 2020-05-18 RX ORDER — ONDANSETRON 4 MG/1
4 TABLET, ORALLY DISINTEGRATING ORAL EVERY 6 HOURS PRN
Qty: 10 TABLET | Refills: 0 | Status: SHIPPED | OUTPATIENT
Start: 2020-05-18

## 2020-05-18 RX ORDER — DEXTROAMPHETAMINE SACCHARATE, AMPHETAMINE ASPARTATE, DEXTROAMPHETAMINE SULFATE AND AMPHETAMINE SULFATE 5; 5; 5; 5 MG/1; MG/1; MG/1; MG/1
20 TABLET ORAL 3 TIMES DAILY
COMMUNITY

## 2020-05-18 RX ORDER — SODIUM CHLORIDE 0.9 % (FLUSH) 0.9 %
10 SYRINGE (ML) INJECTION AS NEEDED
Status: DISCONTINUED | OUTPATIENT
Start: 2020-05-18 | End: 2020-05-19 | Stop reason: HOSPADM

## 2020-05-18 RX ADMIN — IOPAMIDOL 85 ML: 612 INJECTION, SOLUTION INTRAVENOUS at 21:45

## 2020-05-18 RX ADMIN — SODIUM CHLORIDE, POTASSIUM CHLORIDE, SODIUM LACTATE AND CALCIUM CHLORIDE 1000 ML: 600; 310; 30; 20 INJECTION, SOLUTION INTRAVENOUS at 21:19

## 2020-05-18 RX ADMIN — ONDANSETRON HYDROCHLORIDE 4 MG: 2 SOLUTION INTRAMUSCULAR; INTRAVENOUS at 21:28

## 2020-05-18 RX ADMIN — MORPHINE SULFATE 4 MG: 2 INJECTION, SOLUTION INTRAMUSCULAR; INTRAVENOUS at 21:27

## 2020-05-18 NOTE — ED TRIAGE NOTES
"Pt to ER via PV. Pt c/o upper abd pain with consistent vomiting x2 days. Pt states \"I can't hold anything down.\" Pt also states \"I'm really shaky.\"     Pt arrived in mask. Triage staff in masks.   "

## 2020-05-18 NOTE — TELEPHONE ENCOUNTER
"Pt states he has only urinated once today, is very thirsty. Has vomited \">100\" times today.  Constant, walking around with bucket. Will put on mask and go to ED. I called [facility] ED and no answer.      Reason for Disposition  • Patient sounds very sick or weak to the triager    Additional Information  • Negative: SEVERE difficulty breathing (e.g., struggling for each breath, speaks in single words)  • Negative: Difficult to awaken or acting confused (e.g., disoriented, slurred speech)  • Negative: Bluish (or gray) lips or face now  • Negative: Shock suspected (e.g., cold/pale/clammy skin, too weak to stand, low BP, rapid pulse)  • Negative: Sounds like a life-threatening emergency to the triager  • Negative: [1] COVID-19 suspected (e.g., cough, fever, shortness of breath) AND [2] public health department recommends testing  • Negative: [1] COVID-19 exposure AND [2] no symptoms  • Negative: COVID-19 and Breastfeeding, questions about  • Negative: SEVERE or constant chest pain (Exception: mild central chest pain, present only when coughing)  • Negative: MODERATE difficulty breathing (e.g., speaks in phrases, SOB even at rest, pulse 100-120)    Answer Assessment - Initial Assessment Questions  1. COVID-19 DIAGNOSIS: \"Who made your Coronavirus (COVID-19) diagnosis?\" \"Was it confirmed by a positive lab test?\" If not diagnosed by a HCP, ask \"Are there lots of cases (community spread) where you live?\" (See public health department website, if unsure)    * MAJOR community spread: high number of cases; numbers of cases are increasing; many people hospitalized.    * MINOR community spread: low number of cases; not increasing; few or no people hospitalized      Is vomiting and diarrhea.  Shakey, fatigue, chills,    2. ONSET: \"When did the COVID-19 symptoms start?\"       2 days  3. WORST SYMPTOM: \"What is your worst symptom?\" (e.g., cough, fever, shortness of breath, muscle aches)      Chills and vomiting  4. COUGH: \"How " "bad is the cough?\"        No cough  5. FEVER: \"Do you have a fever?\" If so, ask: \"What is your temperature, how was it measured, and when did it start?\"      Has not checked temperature  6. RESPIRATORY STATUS: \"Describe your breathing?\" (e.g., shortness of breath, wheezing, unable to speak)       No wheezing  7. BETTER-SAME-WORSE: \"Are you getting better, staying the same or getting worse compared to yesterday?\"  If getting worse, ask, \"In what way?\"     Same  8. HIGH RISK DISEASE: \"Do you have any chronic medical problems?\" (e.g., asthma, heart or lung disease, weak immune system, etc.)      NA  9. PREGNANCY: \"Is there any chance you are pregnant?\" \"When was your last menstrual period?\"      NA  10. OTHER SYMPTOMS: \"Do you have any other symptoms?\"  (e.g., runny nose, headache, sore throat, loss of smell)        None    Protocols used: CORONAVIRUS (COVID-19) DIAGNOSED OR SUSPECTED-ADULT-AH      "

## 2020-05-19 NOTE — ED PROVIDER NOTES
EMERGENCY DEPARTMENT ENCOUNTER    CHIEF COMPLAINT  Chief Complaint: Abdominal pain with vomiting  History given by: Patient  History limited by: None  Room Number: 41/41  PMD: Provider, No Known      HPI:  Pt is a 39 y.o. male who presents complaining of 2 days of abdominal pain with nausea, vomiting and diarrhea after eating lobster bisque at his mother's house.  He states that he has not seen blood in his vomit or diarrhea.  He has developed slowly increasing periumbilical pain which he describes as crampy and is worse with movement.  He states the ride here this evening was painful when he went over bumps.  He denies fever, cough, sore throat.  He says that he is very hungry but when he tries to eat he vomits.    Patient was placed in face mask in first look. Patient was wearing facemask when I entered the room and throughout our encounter. I wore full protective equipment throughout this patient encounter including a face mask and gloves. Hand hygiene was performed before donning protective equipment and after removal when leaving the room.        PAST MEDICAL HISTORY  Active Ambulatory Problems     Diagnosis Date Noted   • Alcohol dependence with withdrawal (CMS/[facility]) 10/02/2019   • Tobacco use disorder 10/02/2019   • Hydrocodone use disorder, mild (CMS/HCC) 10/02/2019   • ADHD 10/07/2019     Resolved Ambulatory Problems     Diagnosis Date Noted   • No Resolved Ambulatory Problems     Past Medical History:   Diagnosis Date   • ADHD (attention deficit hyperactivity disorder)    • Alcoholism (CMS/[facility])        PAST SURGICAL HISTORY  Past Surgical History:   Procedure Laterality Date   • ENDOSCOPY N/A 12/18/2019    Procedure: ESOPHAGOGASTRODUODENOSCOPY WITH REMOVAL OF FOREIGN BODY;  Surgeon: Tariq Padilla MD;  Location: [facility];  Service: Gastroenterology   • NO PAST SURGERIES         FAMILY HISTORY  Family History   Problem Relation Age of Onset   • Alcohol abuse Father        SOCIAL HISTORY  Social  History     Socioeconomic History   • Marital status: Single     Spouse name: Not on file   • Number of children: Not on file   • Years of education: Not on file   • Highest education level: Not on file   Tobacco Use   • Smoking status: Current Every Day Smoker     Packs/day: 1.00   • Smokeless tobacco: Never Used   Substance and Sexual Activity   • Alcohol use: Yes     Comment: see below   • Drug use: No     Comment: too k some pain pills in past 30 days, not a lot and  only on occ   • Sexual activity: Defer       ALLERGIES  Patient has no known allergies.    REVIEW OF SYSTEMS  Review of Systems   Constitutional: Negative for activity change, appetite change and fever.   HENT: Negative for congestion and sore throat.    Eyes: Negative.    Respiratory: Negative for cough and shortness of breath.    Cardiovascular: Negative for chest pain and leg swelling.   Gastrointestinal: Positive for abdominal pain (periumbilical for 2 days), diarrhea, nausea and vomiting.   Endocrine: Negative.    Genitourinary: Negative for decreased urine volume, dysuria, flank pain and hematuria.   Musculoskeletal: Negative for neck pain.   Skin: Negative for rash and wound.   Allergic/Immunologic: Negative.    Neurological: Negative for weakness, numbness and headaches.   Hematological: Negative.    Psychiatric/Behavioral: Negative.    All other systems reviewed and are negative.      PHYSICAL EXAM  ED Triage Vitals   Temp Heart Rate Resp BP SpO2   05/18/20 1938 05/18/20 1938 05/18/20 1938 05/18/20 2053 05/18/20 1938   97.6 °F (36.4 °C) 72 18 (!) 153/105 96 %      Temp src Heart Rate Source Patient Position BP Location FiO2 (%)   05/18/20 1938 -- 05/18/20 2053 05/18/20 2053 --   Tympanic  Lying Left arm          Physical Exam   Constitutional: He appears distressed (mild).   HENT:   Head: Normocephalic and atraumatic.   Eyes: EOM are normal.   Neck: Neck supple. No thyromegaly present.   Cardiovascular: Normal rate, regular rhythm and  normal heart sounds.   Pulmonary/Chest: Effort normal and breath sounds normal. No respiratory distress. He has no wheezes.   Abdominal: Soft. Bowel sounds are normal. There is tenderness in the right lower quadrant. There is rebound, guarding (involuntary RLQ) and tenderness at McBurney's point.   Lymphadenopathy:     He has no cervical adenopathy.   Neurological: He is alert.   Skin: Skin is warm and dry.       LAB RESULTS  Lab Results (last 24 hours)     Procedure Component Value Units Date/Time    CBC & Differential [[id number]] Collected:  05/18/20 2052    Specimen:  Blood Updated:  05/18/20 2111    Narrative:       The following orders were created for panel order CBC & Differential.  Procedure                               Abnormality         Status                     ---------                               -----------         ------                     CBC Auto Differential[[id number]]        Abnormal            Final result                 Please view results for these tests on the individual orders.    Comprehensive Metabolic Panel [[id number]]  (Abnormal) Collected:  05/18/20 2052    Specimen:  Blood Updated:  05/18/20 2134     Glucose 106 mg/dL      BUN 20 mg/dL      Creatinine 1.04 mg/dL      Sodium 135 mmol/L      Potassium 4.0 mmol/L      Chloride 95 mmol/L      CO2 24.3 mmol/L      Calcium 9.3 mg/dL      Total Protein 8.2 g/dL      Albumin 4.90 g/dL      ALT (SGPT) 79 U/L      AST (SGOT) 95 U/L      Alkaline Phosphatase 78 U/L      Total Bilirubin 1.8 mg/dL      eGFR Non African Amer 79 mL/min/1.73      Globulin 3.3 gm/dL      A/G Ratio 1.5 g/dL      BUN/Creatinine Ratio 19.2     Anion Gap 15.7 mmol/L     Narrative:       GFR Normal >60  Chronic Kidney Disease <60  Kidney Failure <15      Lipase [[id number]]  (Abnormal) Collected:  05/18/20 2052    Specimen:  Blood Updated:  05/18/20 2134     Lipase 75 U/L     CBC Auto Differential [[id number]]  (Abnormal) Collected:  05/18/20 2052    Specimen:  Blood  Updated:  05/18/20 2111     WBC 9.85 10*3/mm3      RBC 5.68 10*6/mm3      Hemoglobin 18.1 g/dL      Hematocrit 51.2 %      MCV 90.1 fL      MCH 31.9 pg      MCHC 35.4 g/dL      RDW 12.6 %      RDW-SD 40.9 fl      MPV 8.8 fL      Platelets 242 10*3/mm3      Neutrophil % 64.7 %      Lymphocyte % 25.3 %      Monocyte % 9.1 %      Eosinophil % 0.3 %      Basophil % 0.4 %      Immature Grans % 0.2 %      Neutrophils, Absolute 6.37 10*3/mm3      Lymphocytes, Absolute 2.49 10*3/mm3      Monocytes, Absolute 0.90 10*3/mm3      Eosinophils, Absolute 0.03 10*3/mm3      Basophils, Absolute 0.04 10*3/mm3      Immature Grans, Absolute 0.02 10*3/mm3      nRBC 0.0 /100 WBC     Urinalysis With Microscopic If Indicated (No Culture) - Urine, Clean Catch [[id number]]  (Abnormal) Collected:  05/18/20 2120    Specimen:  Urine, Clean Catch Updated:  05/18/20 2152     Color, UA Yellow     Appearance, UA Clear     pH, UA <=5.0     Specific Gravity, UA >=1.030     Glucose, UA Negative     Ketones, UA Negative     Bilirubin, UA Negative     Blood, UA Negative     Protein, UA 30 mg/dL (1+)     Leuk Esterase, UA Negative     Nitrite, UA Negative     Urobilinogen, UA 0.2 E.U./dL    Urinalysis, Microscopic Only - Urine, Clean Catch [[id number]] Collected:  05/18/20 2120    Specimen:  Urine, Clean Catch Updated:  05/18/20 2152     RBC, UA 0-2 /HPF      WBC, UA 0-2 /HPF      Bacteria, UA None Seen /HPF      Squamous Epithelial Cells, UA 0-2 /HPF      Hyaline Casts, UA 3-6 /LPF      Methodology Automated Microscopy          I ordered the above labs and reviewed the results    RADIOLOGY  CT Abdomen Pelvis With Contrast   Final Result       1. Hepatomegaly and diffuse hepatic steatosis.   2. No evidence of appendicitis.   3. Possible low-attenuation lesion within the right kidney, measuring   higher in density than a simple cyst. Further evaluation with renal   ultrasound on an outpatient basis is suggested.       Radiation dose reduction techniques  were utilized, including automated   exposure control and exposure modulation based on body size.       This report was finalized on 5/18/2020 10:15 PM by Dr. Corrine Beauchamp M.D.               I ordered the above noted radiological studies. Interpreted by radiologist. Reviewed by me in PACS.       PROCEDURES  Procedures      PROGRESS AND CONSULTS  ED Course as of May 18 2256   Mon May 18, 2020   2227 Patient CT of the abdomen pelvis does not show appendicitis.  He does have a right renal cyst that needs outpatient follow-up.    [DE]   2228 I went over the results of patient's blood work and CT.  Patient states that he does not have a family doctor.  However, he is feeling better after IV fluids and Zofran.  I think it is safe to discharge patient to home.    [DE]   2229 Hemoglobin(!): 18.1 [DE]   2229 Sodium(!): 135 [DE]      ED Course User Index  [DE] Landon Stanton MD         MEDICAL DECISION MAKING  Results were reviewed/discussed with the patient and they were also made aware of online access. Pt also made aware that some labs, such as cultures, will not be resulted during ER visit and follow up with PMD is necessary.     MDM  Number of Diagnoses or Management Options  Dehydration:   Gastroenteritis:   Renal cyst, right:      Amount and/or Complexity of Data Reviewed  Tests in the radiology section of CPT®: reviewed and ordered  Tests in the medicine section of CPT®: reviewed  Independent visualization of images, tracings, or specimens: yes           DIAGNOSIS  Final diagnoses:   Gastroenteritis   Dehydration   Renal cyst, right       DISPOSITION  DISCHARGE    Patient discharged in stable condition.    Reviewed implications of results, diagnosis, meds, responsibility to follow up, warning signs and symptoms of possible worsening, potential complications and reasons to return to ER, including new or worsening symptoms.    Patient/Family voiced understanding of above instructions.    Discussed plan for  discharge, as there is no emergent indication for admission. Patient referred to primary care provider for BP management due to today's BP. Pt/family is agreeable and understands need for follow up and repeat testing.  Pt is aware that discharge does not mean that nothing is wrong but it indicates no emergency is present that requires admission and they must continue care with follow-up as given below or physician of their choice.     FOLLOW-UP  PATIENT LIAISON [place] [address]  [phone]  Schedule an appointment as soon as possible for a visit       Parvez Tate MD  [address] [id number] [phone]    Schedule an appointment as soon as possible for a visit   For further evaluation of your renal cyst         Medication List      New Prescriptions    dicyclomine 20 MG tablet  Commonly known as:  BENTYL  Take 1 tablet by mouth Every 6 (Six) Hours As Needed (cramping).     ondansetron ODT 4 MG disintegrating tablet  Commonly known as:  ZOFRAN-ODT  Place 1 tablet on the tongue Every 6 (Six) Hours As Needed for Nausea.              Latest Documented Vital Signs:  As of 22:56  BP- (!) 153/105 HR- 72 Temp- 97.6 °F (36.4 °C) (Tympanic) O2 sat- 96%    --  Dragon disclaimer:   Much of this encounter note is an electronic transcription/translation of spoken language to printed text.  The electronic translation of spoken language may permit erroneous, or at times, nonsensical words or phrases to be inadvertently transcribed.  Although I have reviewed the note for such areas, some may still exist.       Landon Stanton MD  05/18/20 9895

## 2020-05-19 NOTE — DISCHARGE INSTRUCTIONS
Drink plenty of fluids and rest.  Use the Zofran as needed for nausea and the Bentyl as needed for abdominal cramping.  You can use over-the-counter Pepcid or Prilosec as needed for your stomach.  Please return to the emergency department symptoms worsen with fever or increasing abdominal pain.    You do have a small cyst on your right kidney.  Try to follow-up with your family doctor.  If you are unable to find one, try contacting Dr. Tate for further evaluation.

## 2020-06-09 ENCOUNTER — HOSPITAL ENCOUNTER (EMERGENCY)
Facility: HOSPITAL | Age: 40
Discharge: HOME | End: 2020-06-10
Attending: EMERGENCY MEDICINE | Admitting: EMERGENCY MEDICINE

## 2020-06-09 VITALS
WEIGHT: 160 LBS | OXYGEN SATURATION: 97 % | TEMPERATURE: 97.8 F | DIASTOLIC BLOOD PRESSURE: 96 MMHG | RESPIRATION RATE: 18 BRPM | SYSTOLIC BLOOD PRESSURE: 140 MMHG | HEIGHT: 68 IN | BODY MASS INDEX: 24.25 KG/M2 | HEART RATE: 100 BPM

## 2020-06-09 DIAGNOSIS — F10.920 ALCOHOLIC INTOXICATION WITHOUT COMPLICATION (HCC): Primary | ICD-10-CM

## 2020-06-09 LAB
ALBUMIN SERPL-MCNC: 4.9 G/DL (ref 3.5–5.2)
ALBUMIN/GLOB SERPL: 1.7 G/DL
ALP SERPL-CCNC: 85 U/L (ref 39–117)
ALT SERPL W P-5'-P-CCNC: 45 U/L (ref 1–41)
AMPHET+METHAMPHET UR QL: NEGATIVE
ANION GAP SERPL CALCULATED.3IONS-SCNC: 18.3 MMOL/L (ref 5–15)
APAP SERPL-MCNC: <5 MCG/ML (ref 10–30)
AST SERPL-CCNC: 46 U/L (ref 1–40)
BARBITURATES UR QL SCN: NEGATIVE
BASOPHILS # BLD AUTO: 0.05 10*3/MM3 (ref 0–0.2)
BASOPHILS NFR BLD AUTO: 0.8 % (ref 0–1.5)
BENZODIAZ UR QL SCN: NEGATIVE
BILIRUB SERPL-MCNC: 1 MG/DL (ref 0.2–1.2)
BUN BLD-MCNC: 16 MG/DL (ref 6–20)
BUN/CREAT SERPL: 13.7 (ref 7–25)
CALCIUM SPEC-SCNC: 9.2 MG/DL (ref 8.6–10.5)
CANNABINOIDS SERPL QL: NEGATIVE
CHLORIDE SERPL-SCNC: 97 MMOL/L (ref 98–107)
CO2 SERPL-SCNC: 22.7 MMOL/L (ref 22–29)
COCAINE UR QL: NEGATIVE
CREAT BLD-MCNC: 1.17 MG/DL (ref 0.76–1.27)
DEPRECATED RDW RBC AUTO: 44.3 FL (ref 37–54)
EOSINOPHIL # BLD AUTO: 0.07 10*3/MM3 (ref 0–0.4)
EOSINOPHIL NFR BLD AUTO: 1.1 % (ref 0.3–6.2)
ERYTHROCYTE [DISTWIDTH] IN BLOOD BY AUTOMATED COUNT: 13.5 % (ref 12.3–15.4)
ETHANOL BLD-MCNC: 135 MG/DL (ref 0–10)
ETHANOL BLD-MCNC: 281 MG/DL (ref 0–10)
ETHANOL BLD-MCNC: 65 MG/DL (ref 0–10)
ETHANOL UR QL: 0.07 %
ETHANOL UR QL: 0.14 %
ETHANOL UR QL: 0.28 %
GFR SERPL CREATININE-BSD FRML MDRD: 69 ML/MIN/1.73
GLOBULIN UR ELPH-MCNC: 2.9 GM/DL
GLUCOSE BLD-MCNC: 127 MG/DL (ref 65–99)
HCT VFR BLD AUTO: 54.6 % (ref 37.5–51)
HGB BLD-MCNC: 19.2 G/DL (ref 13–17.7)
IMM GRANULOCYTES # BLD AUTO: 0.01 10*3/MM3 (ref 0–0.05)
IMM GRANULOCYTES NFR BLD AUTO: 0.2 % (ref 0–0.5)
LYMPHOCYTES # BLD AUTO: 2.89 10*3/MM3 (ref 0.7–3.1)
LYMPHOCYTES NFR BLD AUTO: 44.5 % (ref 19.6–45.3)
MAGNESIUM SERPL-MCNC: 2.6 MG/DL (ref 1.6–2.6)
MCH RBC QN AUTO: 32.3 PG (ref 26.6–33)
MCHC RBC AUTO-ENTMCNC: 35.2 G/DL (ref 31.5–35.7)
MCV RBC AUTO: 91.9 FL (ref 79–97)
METHADONE UR QL SCN: NEGATIVE
MONOCYTES # BLD AUTO: 0.61 10*3/MM3 (ref 0.1–0.9)
MONOCYTES NFR BLD AUTO: 9.4 % (ref 5–12)
NEUTROPHILS # BLD AUTO: 2.86 10*3/MM3 (ref 1.7–7)
NEUTROPHILS NFR BLD AUTO: 44 % (ref 42.7–76)
NRBC BLD AUTO-RTO: 0 /100 WBC (ref 0–0.2)
OPIATES UR QL: NEGATIVE
OXYCODONE UR QL SCN: NEGATIVE
PLATELET # BLD AUTO: 285 10*3/MM3 (ref 140–450)
PMV BLD AUTO: 8.9 FL (ref 6–12)
POTASSIUM BLD-SCNC: 3.7 MMOL/L (ref 3.5–5.2)
PROT SERPL-MCNC: 7.8 G/DL (ref 6–8.5)
RBC # BLD AUTO: 5.94 10*6/MM3 (ref 4.14–5.8)
SODIUM BLD-SCNC: 138 MMOL/L (ref 136–145)
WBC NRBC COR # BLD: 6.49 10*3/MM3 (ref 3.4–10.8)

## 2020-06-09 PROCEDURE — 80307 DRUG TEST PRSMV CHEM ANLYZR: CPT | Performed by: PHYSICIAN ASSISTANT

## 2020-06-09 PROCEDURE — 99284 EMERGENCY DEPT VISIT MOD MDM: CPT

## 2020-06-09 PROCEDURE — 25010000002 THIAMINE PER 100 MG: Performed by: PHYSICIAN ASSISTANT

## 2020-06-09 PROCEDURE — 25010000002 LORAZEPAM PER 2 MG: Performed by: EMERGENCY MEDICINE

## 2020-06-09 PROCEDURE — 83735 ASSAY OF MAGNESIUM: CPT | Performed by: PHYSICIAN ASSISTANT

## 2020-06-09 PROCEDURE — 80307 DRUG TEST PRSMV CHEM ANLYZR: CPT

## 2020-06-09 PROCEDURE — 80053 COMPREHEN METABOLIC PANEL: CPT | Performed by: PHYSICIAN ASSISTANT

## 2020-06-09 PROCEDURE — 96365 THER/PROPH/DIAG IV INF INIT: CPT

## 2020-06-09 PROCEDURE — 96375 TX/PRO/DX INJ NEW DRUG ADDON: CPT

## 2020-06-09 PROCEDURE — 25010000002 ONDANSETRON PER 1 MG: Performed by: PHYSICIAN ASSISTANT

## 2020-06-09 PROCEDURE — 85025 COMPLETE CBC W/AUTO DIFF WBC: CPT | Performed by: PHYSICIAN ASSISTANT

## 2020-06-09 PROCEDURE — 25010000002 ONDANSETRON PER 1 MG: Performed by: EMERGENCY MEDICINE

## 2020-06-09 PROCEDURE — 80307 DRUG TEST PRSMV CHEM ANLYZR: CPT | Performed by: EMERGENCY MEDICINE

## 2020-06-09 PROCEDURE — 96376 TX/PRO/DX INJ SAME DRUG ADON: CPT

## 2020-06-09 PROCEDURE — 25010000002 MAGNESIUM SULFATE PER 500 MG OF MAGNESIUM: Performed by: PHYSICIAN ASSISTANT

## 2020-06-09 RX ORDER — ONDANSETRON 2 MG/ML
4 INJECTION INTRAMUSCULAR; INTRAVENOUS ONCE
Status: COMPLETED | OUTPATIENT
Start: 2020-06-09 | End: 2020-06-09

## 2020-06-09 RX ORDER — NICOTINE 21 MG/24HR
1 PATCH, TRANSDERMAL 24 HOURS TRANSDERMAL
Status: DISCONTINUED | OUTPATIENT
Start: 2020-06-09 | End: 2020-06-10 | Stop reason: HOSPADM

## 2020-06-09 RX ORDER — LORAZEPAM 2 MG/ML
1 INJECTION INTRAMUSCULAR ONCE
Status: COMPLETED | OUTPATIENT
Start: 2020-06-09 | End: 2020-06-09

## 2020-06-09 RX ADMIN — ONDANSETRON 4 MG: 2 INJECTION INTRAMUSCULAR; INTRAVENOUS at 21:46

## 2020-06-09 RX ADMIN — NICOTINE 1 PATCH: 21 PATCH, EXTENDED RELEASE TRANSDERMAL at 06:25

## 2020-06-09 RX ADMIN — ONDANSETRON 4 MG: 2 INJECTION INTRAMUSCULAR; INTRAVENOUS at 14:43

## 2020-06-09 RX ADMIN — LORAZEPAM 1 MG: 2 INJECTION INTRAMUSCULAR; INTRAVENOUS at 05:01

## 2020-06-09 RX ADMIN — ONDANSETRON 4 MG: 2 INJECTION INTRAMUSCULAR; INTRAVENOUS at 05:01

## 2020-06-09 RX ADMIN — FOLIC ACID 1000 ML/HR: 5 INJECTION, SOLUTION INTRAMUSCULAR; INTRAVENOUS; SUBCUTANEOUS at 05:43

## 2020-06-09 NOTE — ED PROVIDER NOTES
EMERGENCY DEPARTMENT ENCOUNTER    Room Number:  05/05  Date of encounter:  6/9/2020  PCP: Provider, No Known  Historian: Patient      HPI:  Chief Complaint: Alcohol dependency, alcohol withdrawal, and suicidal ideation  A complete HPI/ROS/PMH/PSH/SH/FH is limited due to patient's alcohol intoxication      Context: Morteza Judd is a 40 y.o. male who presents to the ED c/o alcohol dependency, alcohol withdrawal, and suicidal ideation.  The patient states he has been drinking 1/5 a day as long as he can remember.  He states when he tries to stop he has bad withdrawal and becomes tremorous, anxious, and nauseated.  He says that he is not aware of any seizures in the past in association with alcohol withdrawal.  When asked if he has any past significant sobriety, he states he does not.  In October 2019 he was admitted to [facility] for alcohol dependency.  When the patient is asked if he has a current plan to commit suicide, he will not answer question.  He does deny any attempts over the past 24 hours prior to this ER visit.  He also denies any other substance abuse other than alcohol at this time.  Patient's visits to the emergency department since December 2019 have been related to eosinophilic esophagitis and esophageal food bolus obstruction as well as gastroenteritis. The patient denies any further complaints at this time.      PAST MEDICAL HISTORY  Active Ambulatory Problems     Diagnosis Date Noted   • Alcohol dependence with withdrawal (CMS/[facility]) 10/02/2019   • Tobacco use disorder 10/02/2019   • Hydrocodone use disorder, mild (CMS/[facility]) 10/02/2019   • ADHD 10/07/2019     Resolved Ambulatory Problems     Diagnosis Date Noted   • No Resolved Ambulatory Problems     Past Medical History:   Diagnosis Date   • ADHD (attention deficit hyperactivity disorder)    • Alcoholism (CMS/[facility])          PAST SURGICAL HISTORY  Past Surgical History:   Procedure Laterality Date   • ENDOSCOPY N/A  12/18/2019    Procedure: ESOPHAGOGASTRODUODENOSCOPY WITH REMOVAL OF FOREIGN BODY;  Surgeon: Tariq Padilla MD;  Location: [facility] OR;  Service: Gastroenterology   • NO PAST SURGERIES           FAMILY HISTORY  Family History   Problem Relation Age of Onset   • Alcohol abuse Father          SOCIAL HISTORY  Social History     Socioeconomic History   • Marital status: Single     Spouse name: Not on file   • Number of children: Not on file   • Years of education: Not on file   • Highest education level: Not on file   Tobacco Use   • Smoking status: Current Every Day Smoker     Packs/day: 1.00   • Smokeless tobacco: Never Used   Substance and Sexual Activity   • Alcohol use: Yes     Comment: see below   • Drug use: No     Comment: too k some pain pills in past 30 days, not a lot and  only on occ   • Sexual activity: Defer         ALLERGIES  Patient has no known allergies.        REVIEW OF SYSTEMS  Review of Systems     All systems reviewed and negative except for those discussed in HPI.       PHYSICAL EXAM    I have reviewed the triage vital signs and nursing notes.    ED Triage Vitals   Temp Heart Rate Resp BP SpO2   06/09/20 0354 06/09/20 0354 06/09/20 0354 06/09/20 0418 06/09/20 0354   97.8 °F (36.6 °C) (!) 130 18 135/100 95 %      Temp src Heart Rate Source Patient Position BP Location FiO2 (%)   -- 06/09/20 0429 -- -- --    Right;Radial          Physical Exam  GENERAL: Intoxicated appearing and smells of alcohol   HENT: nares patent, mucous membranes unremarkable, NCAT   EYES: no scleral icterus, conjunctiva normal, there are some mild horizontal nystagmus with extraocular movements, pupils are equal round and reactive to light  CV: regular rhythm, regular rate, heart sounds are normal, no JVD or pedal edema noted  RESPIRATORY: normal effort, lungs CTA B  ABDOMEN: soft, nontender  MUSCULOSKELETAL: no deformity  NEURO: alert, moves all extremities, follows commands, patient exhibits slurred speech and unsteady  gait, patient exhibits a mild tremor   SKIN: warm, dry, no obvious skin rash noted  Psych: Patient is anxious        LAB RESULTS  Recent Results (from the past 24 hour(s))   Comprehensive Metabolic Panel    Collection Time: 06/09/20  4:58 AM   Result Value Ref Range    Glucose 127 (H) 65 - 99 mg/dL    BUN 16 6 - 20 mg/dL    Creatinine 1.17 0.76 - 1.27 mg/dL    Sodium 138 136 - 145 mmol/L    Potassium 3.7 3.5 - 5.2 mmol/L    Chloride 97 (L) 98 - 107 mmol/L    CO2 22.7 22.0 - 29.0 mmol/L    Calcium 9.2 8.6 - 10.5 mg/dL    Total Protein 7.8 6.0 - 8.5 g/dL    Albumin 4.90 3.50 - 5.20 g/dL    ALT (SGPT) 45 (H) 1 - 41 U/L    AST (SGOT) 46 (H) 1 - 40 U/L    Alkaline Phosphatase 85 39 - 117 U/L    Total Bilirubin 1.0 0.2 - 1.2 mg/dL    eGFR Non African Amer 69 >60 mL/min/1.73    Globulin 2.9 gm/dL    A/G Ratio 1.7 g/dL    BUN/Creatinine Ratio 13.7 7.0 - 25.0    Anion Gap 18.3 (H) 5.0 - 15.0 mmol/L   Magnesium    Collection Time: 06/09/20  4:58 AM   Result Value Ref Range    Magnesium 2.6 1.6 - 2.6 mg/dL   Ethanol    Collection Time: 06/09/20  4:58 AM   Result Value Ref Range    Ethanol 281 (H) 0 - 10 mg/dL    Ethanol % 0.281 %   Urine Drug Screen - Urine, Clean Catch    Collection Time: 06/09/20  4:58 AM   Result Value Ref Range    Amphet/Methamphet, Screen Negative Negative    Barbiturates Screen, Urine Negative Negative    Benzodiazepine Screen, Urine Negative Negative    Cocaine Screen, Urine Negative Negative    Opiate Screen Negative Negative    THC, Screen, Urine Negative Negative    Methadone Screen, Urine Negative Negative    Oxycodone Screen, Urine Negative Negative   CBC Auto Differential    Collection Time: 06/09/20  4:58 AM   Result Value Ref Range    WBC 6.49 3.40 - 10.80 10*3/mm3    RBC 5.94 (H) 4.14 - 5.80 10*6/mm3    Hemoglobin 19.2 (H) 13.0 - 17.7 g/dL    Hematocrit 54.6 (H) 37.5 - 51.0 %    MCV 91.9 79.0 - 97.0 fL    MCH 32.3 26.6 - 33.0 pg    MCHC 35.2 31.5 - 35.7 g/dL    RDW 13.5 12.3 - 15.4 %    RDW-SD  44.3 37.0 - 54.0 fl    MPV 8.9 6.0 - 12.0 fL    Platelets 285 140 - 450 10*3/mm3    Neutrophil % 44.0 42.7 - 76.0 %    Lymphocyte % 44.5 19.6 - 45.3 %    Monocyte % 9.4 5.0 - 12.0 %    Eosinophil % 1.1 0.3 - 6.2 %    Basophil % 0.8 0.0 - 1.5 %    Immature Grans % 0.2 0.0 - 0.5 %    Neutrophils, Absolute 2.86 1.70 - 7.00 10*3/mm3    Lymphocytes, Absolute 2.89 0.70 - 3.10 10*3/mm3    Monocytes, Absolute 0.61 0.10 - 0.90 10*3/mm3    Eosinophils, Absolute 0.07 0.00 - 0.40 10*3/mm3    Basophils, Absolute 0.05 0.00 - 0.20 10*3/mm3    Immature Grans, Absolute 0.01 0.00 - 0.05 10*3/mm3    nRBC 0.0 0.0 - 0.2 /100 WBC   Acetaminophen Level    Collection Time: 06/09/20  4:58 AM   Result Value Ref Range    Acetaminophen <5.0 (L) 10.0 - 30.0 mcg/mL       Ordered the above labs and independently reviewed the results.        RADIOLOGY  No Radiology Exams Resulted Within Past 24 Hours    I ordered the above noted radiological studies. Reviewed by me and discussed with radiologist.  See dictation for official radiology interpretation.      PROCEDURES    Procedures      MEDICATIONS GIVEN IN ER    Medications   nicotine (NICODERM CQ) 21 MG/24HR patch 1 patch (has no administration in time range)   multiple vitamin (M.V.I. Adult) 10 mL, thiamine (B-1) 100 mg, folic acid 1 mg, magnesium sulfate 2 g in sodium chloride 0.9 % 1,000 mL infusion (1,000 mL/hr Intravenous New Bag 6/9/20 7086)   ondansetron (ZOFRAN) injection 4 mg (4 mg Intravenous Given 6/9/20 0501)   LORazepam (ATIVAN) injection 1 mg (1 mg Intravenous Given 6/9/20 0501)         PROGRESS, DATA ANALYSIS, CONSULTS, AND MEDICAL DECISION MAKING    All labs have been independently reviewed by me.  All radiology studies have been reviewed by me and discussed with radiologist dictating the report.   EKG's independently viewed and interpreted by me.  Discussion below represents my analysis of pertinent findings related to patient's condition, differential diagnosis, treatment plan and  final disposition.    DDX consist of but is not limited to: Alcohol use, complicated alcohol withdrawal, uncomplicated alcohol withdrawal, polysubstance abuse, depression, suicidal ideation.      6 AM: Patient's lab work and access evaluation are pending at this time.  Patient turned over to Dr. Ceja for continued care.    Prior to seeing patient I performed extensive hand washing, saw to it that the patient was wearing a face mask.  Before entering into the room I wore gloves, glasses, and a face mask.  Prior to leaving the room I doffed my gear and performed handwashing.  AS OF 06:08 VITALS:    BP - 135/100  HR - 103  TEMP - 97.8 °F (36.6 °C)  O2 SATS - 94%        DIAGNOSIS  Final diagnoses:   Alcoholic intoxication without complication (CMS/HCC)         DISPOSITION  Pending at time of turnover           Reid Hernandez III, PA  06/09/20 0608

## 2020-06-09 NOTE — ED PROVIDER NOTES
MD ATTESTATION NOTE    The MARY and I have discussed this patient's history, physical exam, and treatment plan.  I have reviewed the documentation and personally had a face to face interaction with the patient. I affirm the documentation and agree with the treatment and plan.  The attached note describes my personal findings.    Patient was placed in face mask in first look. Patient was wearing facemask when I entered the room and throughout our encounter. I wore full protective equipment throughout this patient encounter including a face mask, and gloves. Hand hygiene was performed before donning protective equipment and after removal when leaving the room.      HPI:    Patient presents with history of alcohol abuse and has had increasing alcohol use recently. He is requesting help with his alcohol use. He also states he has had suicidal thoughts for a while, but has made no planning for suicide. He reports a history of shakes and seizures in prior detox.    PE:    HEENT - unremarkable  Lungs - CTA  CV - tachycardia, regular, No chest deformity or tenderness.  Abdomen - non tender, not distended. Patient eating crackers during interview with no reported nausea.    DIAGNOSIS:    Final diagnoses:   Alcoholic intoxication without complication (CMS/[facility])     0700 - Turned over to Dr. Montoya pending sobriety and ACCESS evaluation.    2300 - patient care returned to me ACCESS still attempting to find placement for patient for alcohol treatment. They have found patient not to be suicidal and discharge can be performed with outpatient referral to alcohol treatment programs.    DISPOSITION:    DISCHARGED     Danny Ceja MD  06/09/20 0640       Danny Ceja MD  06/10/20 0033

## 2020-06-09 NOTE — CONSULTS
"41 y/o cauc  father of a 7 y/o daughter coming to the ED w/ c/o's of \"just wanting to end it\" and asking for help w/ his ETOH abuse. ETOH level was 281 at 04:58, 135 at 12:23.  Patient seen at 14:30.  And    Patient admits to drinking t-1.5 pints of vodka / day.  He acknowledges hx of memory loss while intoxicated, loss of appetite while intoxicated, and drinking til he passes out at night.  He has not worked over the past 1/5 weeks due to his ETOH intoxication.  He admits to having had thoughts of wanting to die but denies any plan, method or intent.  \"I just need detox.\"  He denies use of illicit drugs at this time.  He admits to some depression.     Patient sees GRACE Garcia at [facility].  He is on Wellbutrin and Adderall.  He denies prior suicide attempts and denies hx of DT's.  He was at [facility] in Oct 2019 for detox and was admitted to [facility] for appox a week for IP rehab.  He has no other IP psych treatment  He had tried treatment at \"[facility] and ended up at [facility].  He has used LSD and mushrooms briefly in his teens. He Reports his first EOH was as a teen.  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  His ETOH use contributed to his divorce.  It had and is consuming his life.  HE reports that he smoked marijuana while a teen and in college.  In early 20's he snorted Cocaine.  No hx of IVDU.  He has smoked tobacco since his teens. He states that he has had a couple years w/o the tobacco.  Current amount smoked is < 1ppd.  He denies any hx of DT's or ETOH related seizures.      Patient is currently living w/ his mother. He has has bachelor's degree. He has been  x 2 years.  Patient most recently has worked as a .  He had done that from Dec 2019 until approx 1.5 weeks ago.  He believes he will be able to return his job.  He has a bachelor's degree. He has an AK rifle.  He likes target shooting.  "     Patient is alert and O x 4. He is denying any SI or HI at this time. He states he needs detox.  He states he needs more than AA.  That he needs some help to maintain sobriety. He had some dry heaves during the eval.  Minor tremors.  He is resting quietly in the ED.      Will attempt to get pt a [facility] treatment program.      1854  Chart has been faxed to [facility] and [place].  They do not have any Beds.  Have attempted to fax information to [facility] but fax machine says their line is busy.  Chart has also been faxed [facility].  Updated pt on where w are.  He continues to deny SI or HI acknowledging he had wished he was dead. He denies plan intent or concerns for his safety.  He denies any nausea or vomiting now.  He has eaten w/o difficulty.  States he is feeling better.  Explored additional treatment programs w/ patient that are outpatient should he remain stable in ED.

## 2020-06-09 NOTE — ED NOTES
"Pt states to this RN that he is \"just wanting it to end.\" Pt states that he had been drinking \"a lot more than normal\" vodka tonight. Pt states he is a chronic alcoholic and that it has affected his overall life - being his job, friends, and family. Pt states he has no specific plan to hurt himself but \"just wants it to stop.\" Pt presents c darting eyes and constantly breaking eye contact, putting his hand over his eyes - without crying, and is anxious. Pt believes that he is \"just loosing it\" and \"needs help.\" Pt asked about his hx of SI/HI; pt states no HI hx but past hx of overdoses and thoughts. Pt further asked about hx and the pt stated he had access to guns at this time but states the thought of using a firearm to hurt himself is \"scary.\" MD notified of pt's high risk SI assessment, sitter at bedside, and room cleared per protocol.      Christopher Daniel RN  06/09/20 [id number]      Update* pt sexually inappropriate by grabbing RN's leg and rubbing RN's arm when near pt. Pt also rubbed PA's arm during initial assessment.      Christopher Daniel RN  06/09/20 8722    "

## 2020-06-09 NOTE — ED TRIAGE NOTES
"Pt ambulatory to triage desk stating, \"I am sick. I need to detox from alcohol and I want to kill myself.\" pt denies plan to harm self at this time. Pt last drink was 3 hours ago. Pt in mask, staff in mask. Pt restless in wheelchair and repeatedly asking this nurse and other er staff if he can lay in the floor. Pt advised not to do so and informed that his room is getting ready at this time.   "

## 2020-06-10 RX ORDER — CHLORDIAZEPOXIDE HYDROCHLORIDE 25 MG/1
25 CAPSULE, GELATIN COATED ORAL 4 TIMES DAILY PRN
Qty: 20 CAPSULE | Refills: 0 | Status: SHIPPED | OUTPATIENT
Start: 2020-06-10

## 2020-06-10 NOTE — ED NOTES
Patient is complaining of nausea. MD Daniel notified and orders placed.       Etelvina Ribeiro RN  06/09/20 3730

## 2021-07-04 ENCOUNTER — HOSPITAL ENCOUNTER (EMERGENCY)
Facility: HOSPITAL | Age: 41
Discharge: HOME | End: 2021-07-04
Attending: EMERGENCY MEDICINE | Admitting: EMERGENCY MEDICINE

## 2021-07-04 VITALS
WEIGHT: 155 LBS | SYSTOLIC BLOOD PRESSURE: 141 MMHG | OXYGEN SATURATION: 97 % | TEMPERATURE: 97.6 F | HEIGHT: 68 IN | BODY MASS INDEX: 23.49 KG/M2 | RESPIRATION RATE: 16 BRPM | DIASTOLIC BLOOD PRESSURE: 73 MMHG | HEART RATE: 90 BPM

## 2021-07-04 DIAGNOSIS — F10.920 ALCOHOLIC INTOXICATION WITHOUT COMPLICATION (HCC): Primary | ICD-10-CM

## 2021-07-04 DIAGNOSIS — F10.239 ALCOHOL DEPENDENCE WITH WITHDRAWAL WITH COMPLICATION (HCC): ICD-10-CM

## 2021-07-04 DIAGNOSIS — E83.51 HYPOCALCEMIA: ICD-10-CM

## 2021-07-04 LAB
ALBUMIN SERPL-MCNC: 3.8 G/DL (ref 3.5–5.2)
ALBUMIN/GLOB SERPL: 1.8 G/DL
ALP SERPL-CCNC: 80 U/L (ref 39–117)
ALT SERPL W P-5'-P-CCNC: 27 U/L (ref 1–41)
AMPHET+METHAMPHET UR QL: NEGATIVE
ANION GAP SERPL CALCULATED.3IONS-SCNC: 15 MMOL/L (ref 5–15)
AST SERPL-CCNC: 27 U/L (ref 1–40)
BARBITURATES UR QL SCN: NEGATIVE
BASOPHILS # BLD AUTO: 0.02 10*3/MM3 (ref 0–0.2)
BASOPHILS NFR BLD AUTO: 0.4 % (ref 0–1.5)
BENZODIAZ UR QL SCN: NEGATIVE
BILIRUB SERPL-MCNC: 0.5 MG/DL (ref 0–1.2)
BUN SERPL-MCNC: 13 MG/DL (ref 6–20)
BUN/CREAT SERPL: 13.7 (ref 7–25)
CALCIUM SPEC-SCNC: 7.8 MG/DL (ref 8.6–10.5)
CANNABINOIDS SERPL QL: NEGATIVE
CHLORIDE SERPL-SCNC: 101 MMOL/L (ref 98–107)
CO2 SERPL-SCNC: 21 MMOL/L (ref 22–29)
COCAINE UR QL: NEGATIVE
CREAT SERPL-MCNC: 0.95 MG/DL (ref 0.76–1.27)
DEPRECATED RDW RBC AUTO: 41 FL (ref 37–54)
EOSINOPHIL # BLD AUTO: 0.2 10*3/MM3 (ref 0–0.4)
EOSINOPHIL NFR BLD AUTO: 4.2 % (ref 0.3–6.2)
ERYTHROCYTE [DISTWIDTH] IN BLOOD BY AUTOMATED COUNT: 12.6 % (ref 12.3–15.4)
ETHANOL BLD-MCNC: 124 MG/DL (ref 0–10)
ETHANOL BLD-MCNC: 219 MG/DL (ref 0–10)
ETHANOL UR QL: 0.12 %
ETHANOL UR QL: 0.22 %
GFR SERPL CREATININE-BSD FRML MDRD: 87 ML/MIN/1.73
GLOBULIN UR ELPH-MCNC: 2.1 GM/DL
GLUCOSE SERPL-MCNC: 92 MG/DL (ref 65–99)
HCT VFR BLD AUTO: 41.6 % (ref 37.5–51)
HGB BLD-MCNC: 14.6 G/DL (ref 13–17.7)
IMM GRANULOCYTES # BLD AUTO: 0.01 10*3/MM3 (ref 0–0.05)
IMM GRANULOCYTES NFR BLD AUTO: 0.2 % (ref 0–0.5)
INR PPP: 0.93 (ref 0.9–1.1)
LYMPHOCYTES # BLD AUTO: 2.13 10*3/MM3 (ref 0.7–3.1)
LYMPHOCYTES NFR BLD AUTO: 45.1 % (ref 19.6–45.3)
MAGNESIUM SERPL-MCNC: 2.1 MG/DL (ref 1.6–2.6)
MCH RBC QN AUTO: 31.7 PG (ref 26.6–33)
MCHC RBC AUTO-ENTMCNC: 35.1 G/DL (ref 31.5–35.7)
MCV RBC AUTO: 90.4 FL (ref 79–97)
METHADONE UR QL SCN: NEGATIVE
MONOCYTES # BLD AUTO: 0.77 10*3/MM3 (ref 0.1–0.9)
MONOCYTES NFR BLD AUTO: 16.3 % (ref 5–12)
NEUTROPHILS NFR BLD AUTO: 1.59 10*3/MM3 (ref 1.7–7)
NEUTROPHILS NFR BLD AUTO: 33.8 % (ref 42.7–76)
NRBC BLD AUTO-RTO: 0 /100 WBC (ref 0–0.2)
OPIATES UR QL: NEGATIVE
OXYCODONE UR QL SCN: NEGATIVE
PLATELET # BLD AUTO: 176 10*3/MM3 (ref 140–450)
PMV BLD AUTO: 9 FL (ref 6–12)
POTASSIUM SERPL-SCNC: 3.5 MMOL/L (ref 3.5–5.2)
PROT SERPL-MCNC: 5.9 G/DL (ref 6–8.5)
PROTHROMBIN TIME: 12.3 SECONDS (ref 11.7–14.2)
RBC # BLD AUTO: 4.6 10*6/MM3 (ref 4.14–5.8)
SODIUM SERPL-SCNC: 137 MMOL/L (ref 136–145)
WBC # BLD AUTO: 4.72 10*3/MM3 (ref 3.4–10.8)

## 2021-07-04 PROCEDURE — 25010000002 LORAZEPAM PER 2 MG: Performed by: EMERGENCY MEDICINE

## 2021-07-04 PROCEDURE — 82077 ASSAY SPEC XCP UR&BREATH IA: CPT | Performed by: EMERGENCY MEDICINE

## 2021-07-04 PROCEDURE — 80307 DRUG TEST PRSMV CHEM ANLYZR: CPT | Performed by: EMERGENCY MEDICINE

## 2021-07-04 PROCEDURE — 25010000002 CALCIUM GLUCONATE-NACL 1-0.675 GM/50ML-% SOLUTION: Performed by: EMERGENCY MEDICINE

## 2021-07-04 PROCEDURE — 96361 HYDRATE IV INFUSION ADD-ON: CPT

## 2021-07-04 PROCEDURE — 96365 THER/PROPH/DIAG IV INF INIT: CPT

## 2021-07-04 PROCEDURE — 96375 TX/PRO/DX INJ NEW DRUG ADDON: CPT

## 2021-07-04 PROCEDURE — 80053 COMPREHEN METABOLIC PANEL: CPT | Performed by: EMERGENCY MEDICINE

## 2021-07-04 PROCEDURE — 83735 ASSAY OF MAGNESIUM: CPT | Performed by: EMERGENCY MEDICINE

## 2021-07-04 PROCEDURE — 99284 EMERGENCY DEPT VISIT MOD MDM: CPT

## 2021-07-04 PROCEDURE — 96366 THER/PROPH/DIAG IV INF ADDON: CPT

## 2021-07-04 PROCEDURE — 85025 COMPLETE CBC W/AUTO DIFF WBC: CPT | Performed by: EMERGENCY MEDICINE

## 2021-07-04 PROCEDURE — 90791 PSYCH DIAGNOSTIC EVALUATION: CPT

## 2021-07-04 PROCEDURE — 85610 PROTHROMBIN TIME: CPT | Performed by: EMERGENCY MEDICINE

## 2021-07-04 RX ORDER — CALCIUM GLUCONATE 20 MG/ML
1 INJECTION, SOLUTION INTRAVENOUS ONCE
Status: COMPLETED | OUTPATIENT
Start: 2021-07-04 | End: 2021-07-04

## 2021-07-04 RX ORDER — CHLORDIAZEPOXIDE HYDROCHLORIDE 25 MG/1
25 CAPSULE, GELATIN COATED ORAL ONCE
Status: COMPLETED | OUTPATIENT
Start: 2021-07-04 | End: 2021-07-04

## 2021-07-04 RX ORDER — ALUMINA, MAGNESIA, AND SIMETHICONE 2400; 2400; 240 MG/30ML; MG/30ML; MG/30ML
15 SUSPENSION ORAL ONCE
Status: COMPLETED | OUTPATIENT
Start: 2021-07-04 | End: 2021-07-04

## 2021-07-04 RX ORDER — LORAZEPAM 2 MG/ML
1 INJECTION INTRAMUSCULAR ONCE
Status: COMPLETED | OUTPATIENT
Start: 2021-07-04 | End: 2021-07-04

## 2021-07-04 RX ORDER — SODIUM CHLORIDE 9 MG/ML
125 INJECTION, SOLUTION INTRAVENOUS CONTINUOUS
Status: DISCONTINUED | OUTPATIENT
Start: 2021-07-04 | End: 2021-07-04 | Stop reason: HOSPADM

## 2021-07-04 RX ORDER — LIDOCAINE HYDROCHLORIDE 20 MG/ML
15 SOLUTION OROPHARYNGEAL ONCE
Status: COMPLETED | OUTPATIENT
Start: 2021-07-04 | End: 2021-07-04

## 2021-07-04 RX ORDER — CHLORDIAZEPOXIDE HYDROCHLORIDE 25 MG/1
25 CAPSULE, GELATIN COATED ORAL 3 TIMES DAILY
Qty: 9 CAPSULE | Refills: 0 | Status: SHIPPED | OUTPATIENT
Start: 2021-07-04

## 2021-07-04 RX ADMIN — MAGNESIUM HYDROXIDE,ALUMINUM HYDROXICE,SIMETHICONE 15 ML: 240; 2400; 2400 SUSPENSION ORAL at 19:33

## 2021-07-04 RX ADMIN — SODIUM CHLORIDE 1000 ML: 9 INJECTION, SOLUTION INTRAVENOUS at 13:52

## 2021-07-04 RX ADMIN — CALCIUM GLUCONATE 1 G: 20 INJECTION, SOLUTION INTRAVENOUS at 15:14

## 2021-07-04 RX ADMIN — CHLORDIAZEPOXIDE HYDROCHLORIDE 25 MG: 25 CAPSULE ORAL at 20:05

## 2021-07-04 RX ADMIN — LIDOCAINE HYDROCHLORIDE 15 ML: 20 SOLUTION ORAL; TOPICAL at 19:33

## 2021-07-04 RX ADMIN — LORAZEPAM 1 MG: 2 INJECTION INTRAMUSCULAR; INTRAVENOUS at 16:23

## 2021-07-04 RX ADMIN — SODIUM CHLORIDE 125 ML/HR: 9 INJECTION, SOLUTION INTRAVENOUS at 15:14

## 2021-07-04 NOTE — ED NOTES
Patient was placed in face mask in first look. Patient was wearing facemask when I entered the room and throughout our encounter. I wore full protective equipment throughout this patient encounter including a face mask, and gloves. Hand hygiene was performed before donning protective equipment and after removal when leaving the room.       Woo Jacobs RN  07/04/21 1348

## 2021-07-04 NOTE — ED PROVIDER NOTES
EMERGENCY DEPARTMENT ENCOUNTER    Room Number:  17/17  Date of encounter:  7/5/2021  PCP: Provider, No Known  Historian: Patient      HPI:  Chief Complaint: I want to stop drinking        Context: Morteza Judd is a 41 y.o. male who presents to the ED c/o wanting to stop drinking.  Patient states he has episodes of heavy drinking.  He states he was in a detox center last year and did well until proximately 7 to 10 days ago when he began drinking again.  He states now he is not able to stop drinking.  He states he talked to a psychiatrist who told him that he should not stop cold turkey and go to the emergency room if he needed help.  The patient states his last drink was prior to arrival.  The patient denies headache, neck pain, chest pain, shortness of breath, fevers, chills, cough, nausea, vomiting, abdominal pain, lower extremity pain, lower extremity swelling or focal neuro deficit.      PAST MEDICAL HISTORY  Active Ambulatory Problems     Diagnosis Date Noted   • Alcohol dependence with withdrawal (CMS/[facility]) 10/02/2019   • Tobacco use disorder 10/02/2019   • Hydrocodone use disorder, mild (CMS/[facility]) 10/02/2019   • ADHD 10/07/2019     Resolved Ambulatory Problems     Diagnosis Date Noted   • No Resolved Ambulatory Problems     Past Medical History:   Diagnosis Date   • ADHD (attention deficit hyperactivity disorder)    • Alcoholism (CMS/[facility])          PAST SURGICAL HISTORY  Past Surgical History:   Procedure Laterality Date   • ENDOSCOPY N/A 12/18/2019    Procedure: ESOPHAGOGASTRODUODENOSCOPY WITH REMOVAL OF FOREIGN BODY;  Surgeon: Tariq Padilla MD;  Location: [facility];  Service: Gastroenterology   • NO PAST SURGERIES           FAMILY HISTORY  Family History   Problem Relation Age of Onset   • Alcohol abuse Father          SOCIAL HISTORY  Social History     Socioeconomic History   • Marital status: Single     Spouse name: Not on file   • Number of children: Not on file   • Years of education: Not on file    • Highest education level: Not on file   Tobacco Use   • Smoking status: Current Every Day Smoker     Packs/day: 1.00   • Smokeless tobacco: Never Used   Substance and Sexual Activity   • Alcohol use: Yes     Comment: see below   • Drug use: No     Comment: too k some pain pills in past 30 days, not a lot and  only on occ   • Sexual activity: Defer         ALLERGIES  Patient has no known allergies.        REVIEW OF SYSTEMS  Review of Systems         All systems reviewed and negative except for those discussed in HPI.     PHYSICAL EXAM    I have reviewed the triage vital signs and nursing notes.    ED Triage Vitals [07/04/21 1312]   Temp Heart Rate Resp BP SpO2   97.6 °F (36.4 °C) (!) 121 16 -- 98 %      Temp src Heart Rate Source Patient Position BP Location FiO2 (%)   Tympanic Monitor -- -- --       GENERAL: 41-year-old well developed, well nourished in no acute distress  HENT: NCAT, neck supple, trachea midline  EYES: no scleral icterus, PERRL, normal conjunctiva  CV: regular rhythm, regular rate, no murmur  RESPIRATORY: unlabored effort, CTAB  ABDOMEN: soft, non-tender, non-distended, bowel sounds present  MUSCULOSKELETAL: no gross deformity, no pedal edema, no calf tenderness  NEURO: alert,  sensory and motor function of extremities intact, speech clear, mental status normal  SKIN: warm, dry, no rash  PSYCH: Anxious affect      PPE  Pt does not present with symptoms for COVID19; however, I was wearing a mask and goggles throughout all patient interaction.    Vital signs and nursing notes reviewed.      LAB RESULTS  Recent Results (from the past 24 hour(s))   Comprehensive Metabolic Panel    Collection Time: 07/04/21  1:49 PM    Specimen: Blood   Result Value Ref Range    Glucose 92 65 - 99 mg/dL    BUN 13 6 - 20 mg/dL    Creatinine 0.95 0.76 - 1.27 mg/dL    Sodium 137 136 - 145 mmol/L    Potassium 3.5 3.5 - 5.2 mmol/L    Chloride 101 98 - 107 mmol/L    CO2 21.0 (L) 22.0 - 29.0 mmol/L    Calcium 7.8 (L) 8.6 -  10.5 mg/dL    Total Protein 5.9 (L) 6.0 - 8.5 g/dL    Albumin 3.80 3.50 - 5.20 g/dL    ALT (SGPT) 27 1 - 41 U/L    AST (SGOT) 27 1 - 40 U/L    Alkaline Phosphatase 80 39 - 117 U/L    Total Bilirubin 0.5 0.0 - 1.2 mg/dL    eGFR Non African Amer 87 >60 mL/min/1.73    Globulin 2.1 gm/dL    A/G Ratio 1.8 g/dL    BUN/Creatinine Ratio 13.7 7.0 - 25.0    Anion Gap 15.0 5.0 - 15.0 mmol/L   Protime-INR    Collection Time: 07/04/21  1:49 PM    Specimen: Blood   Result Value Ref Range    Protime 12.3 11.7 - 14.2 Seconds    INR 0.93 0.90 - 1.10   Ethanol    Collection Time: 07/04/21  1:49 PM    Specimen: Blood   Result Value Ref Range    Ethanol 219 (H) 0 - 10 mg/dL    Ethanol % 0.219 %   Magnesium    Collection Time: 07/04/21  1:49 PM    Specimen: Blood   Result Value Ref Range    Magnesium 2.1 1.6 - 2.6 mg/dL   CBC Auto Differential    Collection Time: 07/04/21  1:49 PM    Specimen: Blood   Result Value Ref Range    WBC 4.72 3.40 - 10.80 10*3/mm3    RBC 4.60 4.14 - 5.80 10*6/mm3    Hemoglobin 14.6 13.0 - 17.7 g/dL    Hematocrit 41.6 37.5 - 51.0 %    MCV 90.4 79.0 - 97.0 fL    MCH 31.7 26.6 - 33.0 pg    MCHC 35.1 31.5 - 35.7 g/dL    RDW 12.6 12.3 - 15.4 %    RDW-SD 41.0 37.0 - 54.0 fl    MPV 9.0 6.0 - 12.0 fL    Platelets 176 140 - 450 10*3/mm3    Neutrophil % 33.8 (L) 42.7 - 76.0 %    Lymphocyte % 45.1 19.6 - 45.3 %    Monocyte % 16.3 (H) 5.0 - 12.0 %    Eosinophil % 4.2 0.3 - 6.2 %    Basophil % 0.4 0.0 - 1.5 %    Immature Grans % 0.2 0.0 - 0.5 %    Neutrophils, Absolute 1.59 (L) 1.70 - 7.00 10*3/mm3    Lymphocytes, Absolute 2.13 0.70 - 3.10 10*3/mm3    Monocytes, Absolute 0.77 0.10 - 0.90 10*3/mm3    Eosinophils, Absolute 0.20 0.00 - 0.40 10*3/mm3    Basophils, Absolute 0.02 0.00 - 0.20 10*3/mm3    Immature Grans, Absolute 0.01 0.00 - 0.05 10*3/mm3    nRBC 0.0 0.0 - 0.2 /100 WBC   Urine Drug Screen - Urine, Clean Catch    Collection Time: 07/04/21  2:05 PM    Specimen: Urine, Clean Catch   Result Value Ref Range     Amphet/Methamphet, Screen Negative Negative    Barbiturates Screen, Urine Negative Negative    Benzodiazepine Screen, Urine Negative Negative    Cocaine Screen, Urine Negative Negative    Opiate Screen Negative Negative    THC, Screen, Urine Negative Negative    Methadone Screen, Urine Negative Negative    Oxycodone Screen, Urine Negative Negative   Ethanol    Collection Time: 07/04/21  5:38 PM    Specimen: Blood   Result Value Ref Range    Ethanol 124 (H) 0 - 10 mg/dL    Ethanol % 0.124 %       Ordered the above labs and independently reviewed the results.        RADIOLOGY  No Radiology Exams Resulted Within Past 24 Hours    I ordered the above noted radiological studies. Independently reviewed by me and discussed with radiologist.  See dictation above for official radiology interpretation.      PROCEDURES    Procedures        MEDICATIONS GIVEN IN ER    Medications   sodium chloride 0.9 % bolus 1,000 mL (0 mL Intravenous Stopped 7/4/21 1540)   calcium gluconate 1g/50ml 0.675% NaCl IV SOLN (0 g Intravenous Stopped 7/4/21 1653)   LORazepam (ATIVAN) injection 1 mg (1 mg Intravenous Given 7/4/21 1623)   aluminum-magnesium hydroxide-simethicone (MAALOX MAX) 400-400-40 MG/5ML suspension 15 mL (15 mL Oral Given 7/4/21 1933)   Lidocaine Viscous HCl (XYLOCAINE) 2 % mouth solution 15 mL (15 mL Mouth/Throat Given 7/4/21 1933)   chlordiazePOXIDE (LIBRIUM) capsule 25 mg (25 mg Oral Given 7/4/21 2005)         PROGRESS, DATA ANALYSIS, CONSULTS, AND MEDICAL DECISION MAKING    All labs have been independently reviewed by me.  All radiology studies have been reviewed by me and discussed with radiologist dictating report.   EKG's independently reviewed by me.  Discussion below represents my analysis of pertinent findings related to patient's condition, differential diagnosis, treatment plan and final disposition.      ED Course as of Jul 05 1025   Sun Jul 04, 2021   1346 The patient is currently alert and orient x3 with stable vital  signs.  I will obtain labs, urinalysis and give the patient IV fluids and consult access for further evaluation.  Of note the patient does deny SI or HI.    [GP]   1443 Patient's calcium level is low.  Patient is intoxicated currently.  I will continue the patient's IV fluids and add calcium while awaiting sobriety and access consult.    [GP]   1609 The patient states he is beginning to feel anxious and jittery and thus I will give him a dose of Ativan in the ER while awaiting sobriety and access evaluation.    [GP]   1742 Upon repeat examination the patient is resting comfortably in the room.  He currently has no signs of withdrawal with normal vital signs and no tremors.  I advised him that we are repeating his alcohol level and that our access center will speak with him once he is closer to sober.  The patient understands and agrees with the plan.  Currently his repeat alcohol level is pending.    [GP]   1756 The patient's repeat alcohol level is 0.124.  We are currently awaiting access consultation.    [GP]   1900 Currently the patient is resting comfortably and awaiting access evaluation.  I have turned the patient over to my partner Dr. Jovan Tate awaiting access evaluation and disposition.    [GP]   1931 Patient not seeking inpatient rehab at this time.  Patient cleared for discharge from a psychiatric standpoint.  Rx: Librium.  patient encouraged to follow-up with PCP for regular health maintenance and reevaluation of hypocalcemia.  Macho query complete. Treatment plan to include limited course of prescribed controlled substance.  Risks including addiction, tolerance, sedation, benefits and alternatives presented to patient.    [RS]      ED Course User Index  [GP] Chris Galindo MD  [RS] Jovan Tate MD           AS OF 10:25 EDT VITALS:    BP - 141/73  HR - 90  TEMP - 97.6 °F (36.4 °C) (Tympanic)  02 SATS - 97%        DIAGNOSIS  Final diagnoses:   Alcoholic intoxication without complication (CMS/HCC)    Hypocalcemia   Alcohol dependence with withdrawal with complication (CMS/HCC)         DISPOSITION  Discharged        EMR Dragon/Transcription disclaimer:   Much of this encounter note is an electronic transcription/translation of spoken language to printed text.        Chris Galindo MD  07/05/21 0892

## 2021-07-04 NOTE — CONSULTS
"40 yo white male evaluated in ED (Room#17) for substance use disorder.intoxicated on arrival but now sober.     Patient previously seen by [facility] June 2020.     Patient states he was inpatient at [facility] last year and has been sober until approximately 7-10 days ago. Patient then states \"well, I drank a couple other times too\". States he is a binge drinker and has been on a binge for over a week drinking vodka; does not specify amount.     Inpatient \"on a detox unit\" prior to [facility] in [place].     States he is a  and took some days off and then was off for the holiday weekend so started drinking. States his 8yo daughter went with her mother to [facility] for 2.5 months; states daughter leaving is part of the reason for relapsing.     States \"I really don't have a current psychiatrist; I was seeing Dr. Reyez but that was a long time ago. I was just calling around and talking to nurses today and was told to come to the ED because stopping cold turkey is dangerous\".     Patient has been given ativan and IVF's in the ED. Patient has eaten box lunch and several sodas.     States \"I did see a counselor or like a psychiatrist with [facility] but I missed an appointment and she kicked me out; that was over a year ago\".     Patient is  twice. Lives with his mother. Works as a .     Patient denies SI. Denies HI. Appears future oriented and states he needs to get back to work.     Spoke with patient about treatment options. States he prefers discharge home and will look at referrals but states he can't attend IOP due to having to work.       Spoke with patient's nurse, mac ARVIZU And Dr. Bebeto lares plan of care. Will give appropriate outpatient resource list to patient.     "

## 2021-07-04 NOTE — ED TRIAGE NOTES
Pt wants help c detox.  His pmd told him not to quit drinking cold turkey.  He is here to get help.  He is an alcoholic.  Last drink just pta    Patient was placed in face mask during first look triage.  Patient was wearing a face mask throughout encounter.  I wore personal protective equipment throughout the encounter.  Hand hygiene was performed before and after patient encounter.

## 2021-12-03 ENCOUNTER — APPOINTMENT (OUTPATIENT)
Dept: VACCINE CLINIC | Facility: HOSPITAL | Age: 41
End: 2021-12-03

## 2023-02-15 ENCOUNTER — APPOINTMENT (OUTPATIENT)
Dept: ULTRASOUND IMAGING | Facility: HOSPITAL | Age: 43
End: 2023-02-15
Payer: MEDICAID

## 2023-02-15 ENCOUNTER — HOSPITAL ENCOUNTER (EMERGENCY)
Facility: HOSPITAL | Age: 43
Discharge: HOME | End: 2023-02-15
Attending: EMERGENCY MEDICINE | Admitting: EMERGENCY MEDICINE
Payer: MEDICAID

## 2023-02-15 VITALS
TEMPERATURE: 97.3 F | HEART RATE: 78 BPM | WEIGHT: 160 LBS | RESPIRATION RATE: 18 BRPM | HEIGHT: 67 IN | SYSTOLIC BLOOD PRESSURE: 108 MMHG | BODY MASS INDEX: 25.11 KG/M2 | DIASTOLIC BLOOD PRESSURE: 68 MMHG | OXYGEN SATURATION: 98 %

## 2023-02-15 DIAGNOSIS — R10.31 INGUINAL PAIN, RIGHT: Primary | ICD-10-CM

## 2023-02-15 LAB
BILIRUB UR QL STRIP: NEGATIVE
CLARITY UR: ABNORMAL
COLOR UR: YELLOW
GLUCOSE UR STRIP-MCNC: NEGATIVE MG/DL
HGB UR QL STRIP.AUTO: NEGATIVE
KETONES UR QL STRIP: NEGATIVE
LEUKOCYTE ESTERASE UR QL STRIP.AUTO: NEGATIVE
NITRITE UR QL STRIP: NEGATIVE
PH UR STRIP.AUTO: 7 [PH] (ref 5–8)
PROT UR QL STRIP: NEGATIVE
SP GR UR STRIP: 1.02 (ref 1–1.03)
UROBILINOGEN UR QL STRIP: ABNORMAL

## 2023-02-15 PROCEDURE — 87591 N.GONORRHOEAE DNA AMP PROB: CPT | Performed by: EMERGENCY MEDICINE

## 2023-02-15 PROCEDURE — 76870 US EXAM SCROTUM: CPT

## 2023-02-15 PROCEDURE — 93976 VASCULAR STUDY: CPT

## 2023-02-15 PROCEDURE — 99283 EMERGENCY DEPT VISIT LOW MDM: CPT

## 2023-02-15 PROCEDURE — 81003 URINALYSIS AUTO W/O SCOPE: CPT | Performed by: EMERGENCY MEDICINE

## 2023-02-15 PROCEDURE — 87491 CHLMYD TRACH DNA AMP PROBE: CPT | Performed by: EMERGENCY MEDICINE

## 2023-02-15 RX ORDER — SILDENAFIL 25 MG/1
25 TABLET, FILM COATED ORAL AS NEEDED
COMMUNITY

## 2023-02-15 NOTE — ED PROVIDER NOTES
EMERGENCY DEPARTMENT ENCOUNTER    Room Number:  35/35  Date of encounter:  2/15/2023  PCP: Provider, No Known  Historian: Patient      HPI:  Chief Complaint: Right inguinal/testicular pain  A complete HPI/ROS/PMH/PSH/SH/FH are unobtainable due to: None    Context: Morteza Judd is a 42 y.o. male who presents to the ED via private vehicle for evaluation for 1 week of right inguinal/testicular pain.  He states that started hurting after having intercourse.  Patient reports being sexually active with a single partner, denies any rash or drainage.  Denies any direct trauma to the testicle otherwise.  Was seen in urgent care on Monday and referred to the ER, went to [place] and left without being seen due to being very busy.  Pain seems to be constant without any aggravating or alleviating factors.  Denies any nausea or vomiting.  No difficulty urinating. Normal bowel movements.       MEDICAL RECORD REVIEW    External (non-ED) record review: Urgent care note reviewed from 2/13/2023 at [place], was sent to the ER out of concerns for possible obstruction or incarcerated hernia, chart review shows that the patient left without being seen at the [place] ER at that time    PAST MEDICAL HISTORY  Active Ambulatory Problems     Diagnosis Date Noted   • Alcohol dependence with withdrawal (HCC) 10/02/2019   • Tobacco use disorder 10/02/2019   • Hydrocodone use disorder, mild (HCC) 10/02/2019   • ADHD 10/07/2019     Resolved Ambulatory Problems     Diagnosis Date Noted   • No Resolved Ambulatory Problems     Past Medical History:   Diagnosis Date   • ADHD (attention deficit hyperactivity disorder)    • Alcoholism (HCC)          PAST SURGICAL HISTORY  Past Surgical History:   Procedure Laterality Date   • ENDOSCOPY N/A 12/18/2019    Procedure: ESOPHAGOGASTRODUODENOSCOPY WITH REMOVAL OF FOREIGN BODY;  Surgeon: Tariq Padilla MD;  Location: [facility] OR;  Service: Gastroenterology   • NO PAST SURGERIES           FAMILY  HISTORY  Family History   Problem Relation Age of Onset   • Alcohol abuse Father          SOCIAL HISTORY  Social History     Socioeconomic History   • Marital status: Single   Tobacco Use   • Smoking status: Every Day     Packs/day: 0.50     Years: 20.00     Pack years: 10.00     Types: Cigarettes   • Smokeless tobacco: Never   Substance and Sexual Activity   • Alcohol use: Yes     Comment: occ   • Drug use: Not Currently     Types: Cocaine(coke), Marijuana     Comment: too k some pain pills in past 30 days, not a lot and  only on occ   • Sexual activity: Defer         ALLERGIES  Patient has no known allergies.        REVIEW OF SYSTEMS  Review of Systems     All systems reviewed and negative except for those discussed in HPI.       PHYSICAL EXAM    I have reviewed the triage vital signs and nursing notes.    ED Triage Vitals   Temp Heart Rate Resp BP SpO2   02/15/23 0925 02/15/23 0925 02/15/23 0925 02/15/23 0930 02/15/23 0925   97.3 °F (36.3 °C) 108 18 138/86 99 %      Temp src Heart Rate Source Patient Position BP Location FiO2 (%)   02/15/23 0925 02/15/23 0925 -- -- --   Tympanic Monitor          Physical Exam  General: No acute distress, nontoxic  HEENT: Mucous membranes moist, atraumatic, EOMI  Neck: Full ROM  Pulm: Symmetric chest rise, nonlabored  Cardiovascular: Regular rate and rhythm, intact distal pulses  GI: Soft, mild right inguinal into the pelvic/scrotal region tenderness but no visible or palpable bulge or hernia noted  : Right testicular tenderness without significant edema,, circumflex reflex intact, tenderness up into the inguinal canal as well but again no palpable mass  MSK: Full ROM, no deformity  Skin: Warm, dry  Neuro: Awake, alert, oriented x 4, GCS 15, moving all extremities, no focal deficits  Psych: Calm, cooperative      N95, protective eye goggles, and gloves used during this encounter. Patient in surgical mask.      LAB RESULTS  Recent Results (from the past 24 hour(s))   Urinalysis  With Microscopic If Indicated (No Culture) - Urine, Clean Catch    Collection Time: 02/15/23 10:35 AM    Specimen: Urine, Clean Catch   Result Value Ref Range    Color, UA Yellow Yellow, Straw    Appearance, UA Cloudy (A) Clear    pH, UA 7.0 5.0 - 8.0    Specific Gravity, UA 1.017 1.005 - 1.030    Glucose, UA Negative Negative    Ketones, UA Negative Negative    Bilirubin, UA Negative Negative    Blood, UA Negative Negative    Protein, UA Negative Negative    Leuk Esterase, UA Negative Negative    Nitrite, UA Negative Negative    Urobilinogen, UA 0.2 E.U./dL 0.2 - 1.0 E.U./dL       Ordered the above labs and independently interpreted results. My findings will be discussed in the medical decision making section below        RADIOLOGY  US Scrotum & Testicles with doppler    Result Date: 2/15/2023  Examination: Scrotal sonogram  Scrotal Doppler  TECHNIQUE: Gray scale, color Doppler and spectral Doppler images of the scrotum were obtained  HISTORY: Right scrotal pain  COMPARISON: None available  FINDINGS: The right testicle measures 3.5 x 3.2 x 1.5 cm, and the left testicle measures 3.1 x 2.8 x 1.9 cm. There are bilateral epididymal cysts. The testicles and epididymides are otherwise normal in appearance, without mass and with preserved Doppler flow.      Normal scrotal sonogram. No sonographic explanation detected for pain.  This report was finalized on 2/15/2023 11:44 AM by Dr. Evangelista Holden M.D.        Ordered the above noted radiological studies.  Independently interpreted by me and my independent review of findings can be found in the ED Course.  See dictation for official radiology interpretation.      PROCEDURES    Procedures      MEDICATIONS GIVEN IN ER    Medications - No data to display      PROGRESS, DATA ANALYSIS, CONSULTS, AND MEDICAL DECISION MAKING    Please note that this section constitutes my independent interpretation of clinical data including lab results, radiology, EKG's.  This constitutes my  independent professional opinion regarding differential diagnosis and management of this patient.  It may include any factors such as history from outside sources, review of external records, social determinants of health, management of medications, response to those treatments, and discussions with other providers.    Differential Diagnosis and Plan: Initial concern for orchitis, epididymitis, inguinal hernia, muscle strain, among others with lower concern for testicular torsion as TWIST score is 0.  We will start with urinalysis and ultrasound of the testicles, may need to expand to a CT scan if nonspecific or reassuring ultrasound.    Additional sources:  - Discussed/ obtained information from independent historians:   None     - Chronic or social conditions impacting care: None     - Shared decision making:  Patient fully updated on and in agreement with the course and plan moving forward    ED Course as of 02/15/23 1458   Wed Feb 15, 2023   1104 Nitrite, UA: Negative [DC]   1104 Leukocytes, UA: Negative [DC]   1104 Blood, UA: Negative [DC]   1142 Discussed with ultrasound tech, no evidence of orchitis, epididymitis, torsion.  No visible hernia [DC]   1253 I discussed with the patient the findings on the ultrasound that were overall reassuring with no evidence of inflammation or obvious hernia.  Discussed with him that for further clarity on potential hernia that we would need a CT scan which she was initially agreeable to, however, he has a dentist appointment at 2 PM that he does not want to miss and ultimately he prefers to leave at this time.  On my exam and by ultrasound findings there is certainly no obvious evidence of hernia, he does not demonstrate overt signs of obstructive process based on exam and lack of vomiting with normal bowel movements.  Recommended anti-inflammatories, outpatient follow-up, ED return for worsening symptoms as needed.  I do not see any emergent need for antibiotics at this  time.  Suspect probably an inguinal strain as a source of pain. [DC]      ED Course User Index  [DC] Taiwo Dodson MD       Hospitalization Considered?:  Hospitalization not strongly considered though if testicular torsion had been found certainly would have been warranted at that time, no evidence of that on ultrasound.    Orders Placed During This Visit:  Orders Placed This Encounter   Procedures   • Chlamydia trachomatis, Neisseria gonorrhoeae, PCR - Urine, Urine, Random Void   • US Scrotum & Testicles with doppler   • Urinalysis With Microscopic If Indicated (No Culture) - Urine, Clean Catch       Additional orders considered but not placed:  Had initially discussed a CT scan with the patient but he ultimately decided that he needed to leave rather than stay for the CT scan which would have been to clarify the presence or absence of an inguinal hernia    Independent interpretation of labs, radiology studies, and discussions with consultants: See ED Course        AS OF 14:58 EST VITALS:    BP - 108/68  HR - 78  TEMP - 97.3 °F (36.3 °C) (Tympanic)  02 SATS - 98%        DIAGNOSIS  Final diagnoses:   Inguinal pain, right         DISPOSITION  DISCHARGE    Patient discharged in stable condition.    Reviewed implications of results, diagnosis, meds, responsibility to follow up, warning signs and symptoms of possible worsening, potential complications and reasons to return to ER. If your blood pressure > 120/80 please follow up with your primary care provider for further management.    Patient/Family voiced understanding of above instructions.    Discussed plan for discharge, as there is no emergent indication for admission. Pt/family is agreeable and understands need for follow up and repeat testing.  Pt is aware that discharge does not mean that nothing is wrong but it indicates no emergency is present that requires admission and they must continue care with follow-up as given below or physician of their choice.      FOLLOW-UP  [facility]  [address]  [facility] [address] [phone]    As needed, If symptoms worsen    Your PCP    Schedule an appointment as soon as possible for a visit   As needed         Medication List      No changes were made to your prescriptions during this visit.                       --    Please note that portions of this were completed with a voice recognition program.       Note Disclaimer: At [facility], we believe that sharing information builds trust and better relationships. You are receiving this note because you are receiving care at [facility] or recently visited. It is possible you will see health information before a provider has talked with you about it. This kind of information can be easy to misunderstand. To help you fully understand what it means for your health, we urge you to discuss this note with your provider.         Taiwo Dodson MD  02/15/23 5061

## 2023-02-15 NOTE — DISCHARGE INSTRUCTIONS
Tylenol and ibuprofen for pain control, ice and heat as needed, rest with gradual return to normal activity as tolerated.  Stay well-hydrated.  ED return for worsening symptoms as needed.

## 2023-02-16 LAB
C TRACH RRNA SPEC QL NAA+PROBE: NEGATIVE
N GONORRHOEA RRNA SPEC QL NAA+PROBE: NEGATIVE

## 2024-08-16 ENCOUNTER — ANESTHESIA (OUTPATIENT)
Dept: PERIOP | Facility: HOSPITAL | Age: 44
End: 2024-08-16

## 2024-08-16 ENCOUNTER — ON CAMPUS - OUTPATIENT (OUTPATIENT)
Age: 44
End: 2024-08-16
Payer: MEDICAID

## 2024-08-16 ENCOUNTER — ON CAMPUS - OUTPATIENT (OUTPATIENT)
Dept: URBAN - METROPOLITAN AREA HOSPITAL 114 | Facility: HOSPITAL | Age: 44
End: 2024-08-16
Payer: MEDICAID

## 2024-08-16 ENCOUNTER — HOSPITAL ENCOUNTER (OUTPATIENT)
Facility: HOSPITAL | Age: 44
Setting detail: OBSERVATION
Discharge: HOME | End: 2024-08-17
Attending: EMERGENCY MEDICINE | Admitting: INTERNAL MEDICINE
Payer: MEDICAID

## 2024-08-16 ENCOUNTER — ANESTHESIA EVENT (OUTPATIENT)
Dept: PERIOP | Facility: HOSPITAL | Age: 44
End: 2024-08-16

## 2024-08-16 DIAGNOSIS — T18.128A FOOD IN ESOPHAGUS CAUSING OTHER INJURY, INITIAL ENCOUNTER: ICD-10-CM

## 2024-08-16 DIAGNOSIS — K22.89 OTHER SPECIFIED DISEASE OF ESOPHAGUS: ICD-10-CM

## 2024-08-16 DIAGNOSIS — K22.2 ESOPHAGEAL OBSTRUCTION: ICD-10-CM
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
## 2024-08-16 DIAGNOSIS — T18.128A ESOPHAGEAL OBSTRUCTION DUE TO FOOD IMPACTION: Primary | ICD-10-CM

## 2024-08-16 PROBLEM — F41.8 ANXIETY ASSOCIATED WITH DEPRESSION: Status: ACTIVE | Noted: 2024-08-16

## 2024-08-16 PROBLEM — F32.A DEPRESSION: Status: ACTIVE | Noted: 2024-08-16

## 2024-08-16 LAB
ALBUMIN SERPL-MCNC: 4.3 G/DL (ref 3.5–5.2)
ALBUMIN/GLOB SERPL: 1.3 G/DL
ALP SERPL-CCNC: 81 U/L (ref 39–117)
ALT SERPL W P-5'-P-CCNC: 23 U/L (ref 1–41)
ANION GAP SERPL CALCULATED.3IONS-SCNC: 11.4 MMOL/L (ref 5–15)
AST SERPL-CCNC: 18 U/L (ref 1–40)
BASOPHILS # BLD AUTO: 0.03 10*3/MM3 (ref 0–0.2)
BASOPHILS NFR BLD AUTO: 0.4 % (ref 0–1.5)
BILIRUB SERPL-MCNC: 0.8 MG/DL (ref 0–1.2)
BUN SERPL-MCNC: 17 MG/DL (ref 6–20)
BUN/CREAT SERPL: 16.5 (ref 7–25)
CALCIUM SPEC-SCNC: 9.7 MG/DL (ref 8.6–10.5)
CHLORIDE SERPL-SCNC: 107 MMOL/L (ref 98–107)
CO2 SERPL-SCNC: 25.6 MMOL/L (ref 22–29)
CREAT SERPL-MCNC: 1.03 MG/DL (ref 0.76–1.27)
DEPRECATED RDW RBC AUTO: 39.6 FL (ref 37–54)
EGFRCR SERPLBLD CKD-EPI 2021: 91.3 ML/MIN/1.73
EOSINOPHIL # BLD AUTO: 0.37 10*3/MM3 (ref 0–0.4)
EOSINOPHIL NFR BLD AUTO: 4.5 % (ref 0.3–6.2)
ERYTHROCYTE [DISTWIDTH] IN BLOOD BY AUTOMATED COUNT: 12.1 % (ref 12.3–15.4)
ETHANOL BLD-MCNC: <10 MG/DL (ref 0–10)
ETHANOL UR QL: <0.01 %
GLOBULIN UR ELPH-MCNC: 3.3 GM/DL
GLUCOSE SERPL-MCNC: 92 MG/DL (ref 65–99)
HCT VFR BLD AUTO: 47.3 % (ref 37.5–51)
HGB BLD-MCNC: 16.1 G/DL (ref 13–17.7)
IMM GRANULOCYTES # BLD AUTO: 0.03 10*3/MM3 (ref 0–0.05)
IMM GRANULOCYTES NFR BLD AUTO: 0.4 % (ref 0–0.5)
LYMPHOCYTES # BLD AUTO: 2.06 10*3/MM3 (ref 0.7–3.1)
LYMPHOCYTES NFR BLD AUTO: 24.8 % (ref 19.6–45.3)
MCH RBC QN AUTO: 30.3 PG (ref 26.6–33)
MCHC RBC AUTO-ENTMCNC: 34 G/DL (ref 31.5–35.7)
MCV RBC AUTO: 88.9 FL (ref 79–97)
MONOCYTES # BLD AUTO: 0.83 10*3/MM3 (ref 0.1–0.9)
MONOCYTES NFR BLD AUTO: 10 % (ref 5–12)
NEUTROPHILS NFR BLD AUTO: 4.99 10*3/MM3 (ref 1.7–7)
NEUTROPHILS NFR BLD AUTO: 59.9 % (ref 42.7–76)
NRBC BLD AUTO-RTO: 0 /100 WBC (ref 0–0.2)
PLATELET # BLD AUTO: 301 10*3/MM3 (ref 140–450)
PMV BLD AUTO: 8.6 FL (ref 6–12)
POTASSIUM SERPL-SCNC: 4.1 MMOL/L (ref 3.5–5.2)
PROT SERPL-MCNC: 7.6 G/DL (ref 6–8.5)
RBC # BLD AUTO: 5.32 10*6/MM3 (ref 4.14–5.8)
SODIUM SERPL-SCNC: 144 MMOL/L (ref 136–145)
WBC NRBC COR # BLD AUTO: 8.31 10*3/MM3 (ref 3.4–10.8)

## 2024-08-16 PROCEDURE — 25010000002 SUGAMMADEX 200 MG/2ML SOLUTION: Performed by: NURSE ANESTHETIST, CERTIFIED REGISTERED

## 2024-08-16 PROCEDURE — G0378 HOSPITAL OBSERVATION PER HR: HCPCS

## 2024-08-16 PROCEDURE — 25010000002 ONDANSETRON PER 1 MG: Performed by: EMERGENCY MEDICINE

## 2024-08-16 PROCEDURE — 25010000002 FENTANYL CITRATE (PF) 50 MCG/ML SOLUTION: Performed by: ANESTHESIOLOGY

## 2024-08-16 PROCEDURE — 25010000002 SUCCINYLCHOLINE PER 20 MG: Performed by: NURSE ANESTHETIST, CERTIFIED REGISTERED

## 2024-08-16 PROCEDURE — 25810000003 LACTATED RINGERS PER 1000 ML: Performed by: ANESTHESIOLOGY

## 2024-08-16 PROCEDURE — 96376 TX/PRO/DX INJ SAME DRUG ADON: CPT

## 2024-08-16 PROCEDURE — 99222 1ST HOSP IP/OBS MODERATE 55: CPT | Performed by: INTERNAL MEDICINE

## 2024-08-16 PROCEDURE — 96375 TX/PRO/DX INJ NEW DRUG ADDON: CPT

## 2024-08-16 PROCEDURE — 25010000002 DEXAMETHASONE SODIUM PHOSPHATE 20 MG/5ML SOLUTION: Performed by: NURSE ANESTHETIST, CERTIFIED REGISTERED

## 2024-08-16 PROCEDURE — 25010000002 MORPHINE PER 10 MG: Performed by: NURSE PRACTITIONER

## 2024-08-16 PROCEDURE — 96374 THER/PROPH/DIAG INJ IV PUSH: CPT

## 2024-08-16 PROCEDURE — 25010000002 PROPOFOL 10 MG/ML EMULSION: Performed by: NURSE ANESTHETIST, CERTIFIED REGISTERED

## 2024-08-16 PROCEDURE — 25810000003 SODIUM CHLORIDE 0.9 % SOLUTION: Performed by: NURSE PRACTITIONER

## 2024-08-16 PROCEDURE — 25010000002 DIAZEPAM PER 5 MG: Performed by: EMERGENCY MEDICINE

## 2024-08-16 PROCEDURE — 99285 EMERGENCY DEPT VISIT HI MDM: CPT

## 2024-08-16 PROCEDURE — 96361 HYDRATE IV INFUSION ADD-ON: CPT

## 2024-08-16 PROCEDURE — 80053 COMPREHEN METABOLIC PANEL: CPT | Performed by: PHYSICIAN ASSISTANT

## 2024-08-16 PROCEDURE — 82077 ASSAY SPEC XCP UR&BREATH IA: CPT | Performed by: NURSE PRACTITIONER

## 2024-08-16 PROCEDURE — 85025 COMPLETE CBC W/AUTO DIFF WBC: CPT | Performed by: PHYSICIAN ASSISTANT

## 2024-08-16 PROCEDURE — 25010000002 ONDANSETRON PER 1 MG: Performed by: NURSE ANESTHETIST, CERTIFIED REGISTERED

## 2024-08-16 PROCEDURE — 25010000002 FENTANYL CITRATE (PF) 50 MCG/ML SOLUTION: Performed by: NURSE ANESTHETIST, CERTIFIED REGISTERED

## 2024-08-16 PROCEDURE — 36415 COLL VENOUS BLD VENIPUNCTURE: CPT

## 2024-08-16 PROCEDURE — 25010000002 GLYCOPYRROLATE 0.2 MG/ML SOLUTION: Performed by: ANESTHESIOLOGY

## 2024-08-16 RX ORDER — PHENYLEPHRINE HCL IN 0.9% NACL 1 MG/10 ML
SYRINGE (ML) INTRAVENOUS AS NEEDED
Status: DISCONTINUED | OUTPATIENT
Start: 2024-08-16 | End: 2024-08-16 | Stop reason: SURG

## 2024-08-16 RX ORDER — MORPHINE SULFATE 2 MG/ML
2 INJECTION, SOLUTION INTRAMUSCULAR; INTRAVENOUS ONCE
Status: COMPLETED | OUTPATIENT
Start: 2024-08-16 | End: 2024-08-16

## 2024-08-16 RX ORDER — SODIUM CHLORIDE 9 MG/ML
100 INJECTION, SOLUTION INTRAVENOUS CONTINUOUS
Status: DISCONTINUED | OUTPATIENT
Start: 2024-08-16 | End: 2024-08-17 | Stop reason: HOSPADM

## 2024-08-16 RX ORDER — PROPOFOL 10 MG/ML
VIAL (ML) INTRAVENOUS AS NEEDED
Status: DISCONTINUED | OUTPATIENT
Start: 2024-08-16 | End: 2024-08-16 | Stop reason: SURG

## 2024-08-16 RX ORDER — DEXAMETHASONE SODIUM PHOSPHATE 4 MG/ML
INJECTION, SOLUTION INTRA-ARTICULAR; INTRALESIONAL; INTRAMUSCULAR; INTRAVENOUS; SOFT TISSUE AS NEEDED
Status: DISCONTINUED | OUTPATIENT
Start: 2024-08-16 | End: 2024-08-16 | Stop reason: SURG

## 2024-08-16 RX ORDER — SODIUM CHLORIDE 0.9 % (FLUSH) 0.9 %
3 SYRINGE (ML) INJECTION EVERY 12 HOURS SCHEDULED
Status: DISCONTINUED | OUTPATIENT
Start: 2024-08-16 | End: 2024-08-16 | Stop reason: HOSPADM

## 2024-08-16 RX ORDER — ONDANSETRON 2 MG/ML
4 INJECTION INTRAMUSCULAR; INTRAVENOUS ONCE AS NEEDED
Status: DISCONTINUED | OUTPATIENT
Start: 2024-08-16 | End: 2024-08-16 | Stop reason: HOSPADM

## 2024-08-16 RX ORDER — ACETAMINOPHEN 650 MG/1
650 SUPPOSITORY RECTAL EVERY 4 HOURS PRN
Status: DISCONTINUED | OUTPATIENT
Start: 2024-08-16 | End: 2024-08-17 | Stop reason: HOSPADM

## 2024-08-16 RX ORDER — LABETALOL HYDROCHLORIDE 5 MG/ML
5 INJECTION, SOLUTION INTRAVENOUS
Status: DISCONTINUED | OUTPATIENT
Start: 2024-08-16 | End: 2024-08-16 | Stop reason: HOSPADM

## 2024-08-16 RX ORDER — IPRATROPIUM BROMIDE AND ALBUTEROL SULFATE 2.5; .5 MG/3ML; MG/3ML
3 SOLUTION RESPIRATORY (INHALATION) ONCE AS NEEDED
Status: DISCONTINUED | OUTPATIENT
Start: 2024-08-16 | End: 2024-08-16 | Stop reason: HOSPADM

## 2024-08-16 RX ORDER — SODIUM CHLORIDE 0.9 % (FLUSH) 0.9 %
3-10 SYRINGE (ML) INJECTION AS NEEDED
Status: DISCONTINUED | OUTPATIENT
Start: 2024-08-16 | End: 2024-08-16 | Stop reason: HOSPADM

## 2024-08-16 RX ORDER — EPHEDRINE SULFATE 50 MG/ML
5 INJECTION, SOLUTION INTRAVENOUS ONCE AS NEEDED
Status: DISCONTINUED | OUTPATIENT
Start: 2024-08-16 | End: 2024-08-16 | Stop reason: HOSPADM

## 2024-08-16 RX ORDER — FAMOTIDINE 10 MG/ML
20 INJECTION, SOLUTION INTRAVENOUS ONCE
Status: DISCONTINUED | OUTPATIENT
Start: 2024-08-16 | End: 2024-08-16 | Stop reason: HOSPADM

## 2024-08-16 RX ORDER — FAMOTIDINE 10 MG/ML
20 INJECTION, SOLUTION INTRAVENOUS ONCE
Status: COMPLETED | OUTPATIENT
Start: 2024-08-16 | End: 2024-08-16

## 2024-08-16 RX ORDER — DIPHENHYDRAMINE HYDROCHLORIDE 50 MG/ML
12.5 INJECTION INTRAMUSCULAR; INTRAVENOUS
Status: DISCONTINUED | OUTPATIENT
Start: 2024-08-16 | End: 2024-08-16 | Stop reason: HOSPADM

## 2024-08-16 RX ORDER — FENTANYL CITRATE 50 UG/ML
INJECTION, SOLUTION INTRAMUSCULAR; INTRAVENOUS AS NEEDED
Status: DISCONTINUED | OUTPATIENT
Start: 2024-08-16 | End: 2024-08-16 | Stop reason: SURG

## 2024-08-16 RX ORDER — SODIUM CHLORIDE 0.9 % (FLUSH) 0.9 %
10 SYRINGE (ML) INJECTION AS NEEDED
Status: DISCONTINUED | OUTPATIENT
Start: 2024-08-16 | End: 2024-08-17 | Stop reason: HOSPADM

## 2024-08-16 RX ORDER — PROMETHAZINE HYDROCHLORIDE 25 MG/1
25 SUPPOSITORY RECTAL ONCE AS NEEDED
Status: DISCONTINUED | OUTPATIENT
Start: 2024-08-16 | End: 2024-08-16 | Stop reason: HOSPADM

## 2024-08-16 RX ORDER — HYDROCODONE BITARTRATE AND ACETAMINOPHEN 7.5; 325 MG/1; MG/1
1 TABLET ORAL EVERY 4 HOURS PRN
Status: DISCONTINUED | OUTPATIENT
Start: 2024-08-16 | End: 2024-08-16 | Stop reason: HOSPADM

## 2024-08-16 RX ORDER — SODIUM CHLORIDE 9 MG/ML
40 INJECTION, SOLUTION INTRAVENOUS AS NEEDED
Status: DISCONTINUED | OUTPATIENT
Start: 2024-08-16 | End: 2024-08-17 | Stop reason: HOSPADM

## 2024-08-16 RX ORDER — LIDOCAINE HYDROCHLORIDE 20 MG/ML
INJECTION, SOLUTION INFILTRATION; PERINEURAL AS NEEDED
Status: DISCONTINUED | OUTPATIENT
Start: 2024-08-16 | End: 2024-08-16 | Stop reason: SURG

## 2024-08-16 RX ORDER — NICOTINE 21 MG/24HR
1 PATCH, TRANSDERMAL 24 HOURS TRANSDERMAL
Status: DISCONTINUED | OUTPATIENT
Start: 2024-08-16 | End: 2024-08-17 | Stop reason: HOSPADM

## 2024-08-16 RX ORDER — HYDROMORPHONE HYDROCHLORIDE 1 MG/ML
0.25 INJECTION, SOLUTION INTRAMUSCULAR; INTRAVENOUS; SUBCUTANEOUS
Status: DISCONTINUED | OUTPATIENT
Start: 2024-08-16 | End: 2024-08-16 | Stop reason: HOSPADM

## 2024-08-16 RX ORDER — FAMOTIDINE 10 MG/ML
20 INJECTION, SOLUTION INTRAVENOUS EVERY 12 HOURS SCHEDULED
Status: DISCONTINUED | OUTPATIENT
Start: 2024-08-16 | End: 2024-08-17 | Stop reason: HOSPADM

## 2024-08-16 RX ORDER — NALOXONE HCL 0.4 MG/ML
0.2 VIAL (ML) INJECTION AS NEEDED
Status: DISCONTINUED | OUTPATIENT
Start: 2024-08-16 | End: 2024-08-16 | Stop reason: HOSPADM

## 2024-08-16 RX ORDER — ONDANSETRON 2 MG/ML
4 INJECTION INTRAMUSCULAR; INTRAVENOUS EVERY 6 HOURS PRN
Status: DISCONTINUED | OUTPATIENT
Start: 2024-08-16 | End: 2024-08-17 | Stop reason: HOSPADM

## 2024-08-16 RX ORDER — ONDANSETRON 4 MG/1
4 TABLET, ORALLY DISINTEGRATING ORAL EVERY 6 HOURS PRN
Status: DISCONTINUED | OUTPATIENT
Start: 2024-08-16 | End: 2024-08-17 | Stop reason: HOSPADM

## 2024-08-16 RX ORDER — MORPHINE SULFATE 2 MG/ML
4 INJECTION, SOLUTION INTRAMUSCULAR; INTRAVENOUS ONCE
Status: DISCONTINUED | OUTPATIENT
Start: 2024-08-16 | End: 2024-08-16

## 2024-08-16 RX ORDER — SODIUM CHLORIDE 0.9 % (FLUSH) 0.9 %
10 SYRINGE (ML) INJECTION EVERY 12 HOURS SCHEDULED
Status: DISCONTINUED | OUTPATIENT
Start: 2024-08-16 | End: 2024-08-17 | Stop reason: HOSPADM

## 2024-08-16 RX ORDER — GLYCOPYRROLATE 0.2 MG/ML
0.2 INJECTION INTRAMUSCULAR; INTRAVENOUS
Status: COMPLETED | OUTPATIENT
Start: 2024-08-16 | End: 2024-08-16

## 2024-08-16 RX ORDER — ACETAMINOPHEN 325 MG/1
650 TABLET ORAL EVERY 4 HOURS PRN
Status: DISCONTINUED | OUTPATIENT
Start: 2024-08-16 | End: 2024-08-17 | Stop reason: HOSPADM

## 2024-08-16 RX ORDER — FLUMAZENIL 0.1 MG/ML
0.2 INJECTION INTRAVENOUS AS NEEDED
Status: DISCONTINUED | OUTPATIENT
Start: 2024-08-16 | End: 2024-08-16 | Stop reason: HOSPADM

## 2024-08-16 RX ORDER — LIDOCAINE HYDROCHLORIDE 10 MG/ML
0.5 INJECTION, SOLUTION INFILTRATION; PERINEURAL ONCE AS NEEDED
Status: DISCONTINUED | OUTPATIENT
Start: 2024-08-16 | End: 2024-08-16 | Stop reason: HOSPADM

## 2024-08-16 RX ORDER — BISACODYL 5 MG/1
5 TABLET, DELAYED RELEASE ORAL DAILY PRN
Status: DISCONTINUED | OUTPATIENT
Start: 2024-08-16 | End: 2024-08-17 | Stop reason: HOSPADM

## 2024-08-16 RX ORDER — HYDROCODONE BITARTRATE AND ACETAMINOPHEN 5; 325 MG/1; MG/1
1 TABLET ORAL ONCE AS NEEDED
Status: DISCONTINUED | OUTPATIENT
Start: 2024-08-16 | End: 2024-08-16 | Stop reason: HOSPADM

## 2024-08-16 RX ORDER — DROPERIDOL 2.5 MG/ML
0.62 INJECTION, SOLUTION INTRAMUSCULAR; INTRAVENOUS
Status: DISCONTINUED | OUTPATIENT
Start: 2024-08-16 | End: 2024-08-16 | Stop reason: HOSPADM

## 2024-08-16 RX ORDER — SODIUM CHLORIDE, SODIUM LACTATE, POTASSIUM CHLORIDE, CALCIUM CHLORIDE 600; 310; 30; 20 MG/100ML; MG/100ML; MG/100ML; MG/100ML
9 INJECTION, SOLUTION INTRAVENOUS CONTINUOUS
Status: DISCONTINUED | OUTPATIENT
Start: 2024-08-16 | End: 2024-08-17 | Stop reason: HOSPADM

## 2024-08-16 RX ORDER — POLYETHYLENE GLYCOL 3350 17 G/17G
17 POWDER, FOR SOLUTION ORAL DAILY PRN
Status: DISCONTINUED | OUTPATIENT
Start: 2024-08-16 | End: 2024-08-17 | Stop reason: HOSPADM

## 2024-08-16 RX ORDER — CALCIUM CARBONATE 500 MG/1
2 TABLET, CHEWABLE ORAL 2 TIMES DAILY PRN
Status: DISCONTINUED | OUTPATIENT
Start: 2024-08-16 | End: 2024-08-17 | Stop reason: HOSPADM

## 2024-08-16 RX ORDER — ONDANSETRON 2 MG/ML
4 INJECTION INTRAMUSCULAR; INTRAVENOUS ONCE
Status: COMPLETED | OUTPATIENT
Start: 2024-08-16 | End: 2024-08-16

## 2024-08-16 RX ORDER — AMOXICILLIN 250 MG
2 CAPSULE ORAL 2 TIMES DAILY PRN
Status: DISCONTINUED | OUTPATIENT
Start: 2024-08-16 | End: 2024-08-17 | Stop reason: HOSPADM

## 2024-08-16 RX ORDER — FENTANYL CITRATE 50 UG/ML
25 INJECTION, SOLUTION INTRAMUSCULAR; INTRAVENOUS
Status: DISCONTINUED | OUTPATIENT
Start: 2024-08-16 | End: 2024-08-16 | Stop reason: HOSPADM

## 2024-08-16 RX ORDER — PROMETHAZINE HYDROCHLORIDE 25 MG/1
25 TABLET ORAL ONCE AS NEEDED
Status: DISCONTINUED | OUTPATIENT
Start: 2024-08-16 | End: 2024-08-16 | Stop reason: HOSPADM

## 2024-08-16 RX ORDER — ONDANSETRON 2 MG/ML
INJECTION INTRAMUSCULAR; INTRAVENOUS AS NEEDED
Status: DISCONTINUED | OUTPATIENT
Start: 2024-08-16 | End: 2024-08-16 | Stop reason: SURG

## 2024-08-16 RX ORDER — ACETAMINOPHEN 160 MG/5ML
650 SOLUTION ORAL EVERY 4 HOURS PRN
Status: DISCONTINUED | OUTPATIENT
Start: 2024-08-16 | End: 2024-08-17 | Stop reason: HOSPADM

## 2024-08-16 RX ORDER — ROCURONIUM BROMIDE 10 MG/ML
INJECTION, SOLUTION INTRAVENOUS AS NEEDED
Status: DISCONTINUED | OUTPATIENT
Start: 2024-08-16 | End: 2024-08-16 | Stop reason: SURG

## 2024-08-16 RX ORDER — SUCCINYLCHOLINE CHLORIDE 20 MG/ML
INJECTION INTRAMUSCULAR; INTRAVENOUS AS NEEDED
Status: DISCONTINUED | OUTPATIENT
Start: 2024-08-16 | End: 2024-08-16 | Stop reason: SURG

## 2024-08-16 RX ORDER — MIDAZOLAM HYDROCHLORIDE 1 MG/ML
1 INJECTION INTRAMUSCULAR; INTRAVENOUS
Status: DISCONTINUED | OUTPATIENT
Start: 2024-08-16 | End: 2024-08-16 | Stop reason: HOSPADM

## 2024-08-16 RX ORDER — FENTANYL CITRATE 50 UG/ML
50 INJECTION, SOLUTION INTRAMUSCULAR; INTRAVENOUS ONCE AS NEEDED
Status: COMPLETED | OUTPATIENT
Start: 2024-08-16 | End: 2024-08-16

## 2024-08-16 RX ORDER — BISACODYL 10 MG
10 SUPPOSITORY, RECTAL RECTAL DAILY PRN
Status: DISCONTINUED | OUTPATIENT
Start: 2024-08-16 | End: 2024-08-17 | Stop reason: HOSPADM

## 2024-08-16 RX ORDER — DIAZEPAM 10 MG/2ML
2.5 INJECTION, SOLUTION INTRAMUSCULAR; INTRAVENOUS ONCE
Status: COMPLETED | OUTPATIENT
Start: 2024-08-16 | End: 2024-08-16

## 2024-08-16 RX ORDER — HYDRALAZINE HYDROCHLORIDE 20 MG/ML
5 INJECTION INTRAMUSCULAR; INTRAVENOUS
Status: DISCONTINUED | OUTPATIENT
Start: 2024-08-16 | End: 2024-08-16 | Stop reason: HOSPADM

## 2024-08-16 RX ADMIN — ONDANSETRON 4 MG: 2 INJECTION INTRAMUSCULAR; INTRAVENOUS at 01:28

## 2024-08-16 RX ADMIN — Medication 10 ML: at 08:55

## 2024-08-16 RX ADMIN — SUGAMMADEX 200 MG: 100 INJECTION, SOLUTION INTRAVENOUS at 12:54

## 2024-08-16 RX ADMIN — PROPOFOL 200 MG: 10 INJECTION, EMULSION INTRAVENOUS at 11:34

## 2024-08-16 RX ADMIN — NICOTINE 1 PATCH: 14 PATCH TRANSDERMAL at 17:01

## 2024-08-16 RX ADMIN — FAMOTIDINE 20 MG: 10 INJECTION INTRAVENOUS at 08:52

## 2024-08-16 RX ADMIN — SUCCINYLCHOLINE CHLORIDE 180 MG: 20 INJECTION, SOLUTION INTRAMUSCULAR; INTRAVENOUS; PARENTERAL at 11:34

## 2024-08-16 RX ADMIN — Medication 10 ML: at 20:31

## 2024-08-16 RX ADMIN — MORPHINE SULFATE 2 MG: 2 INJECTION, SOLUTION INTRAMUSCULAR; INTRAVENOUS at 05:11

## 2024-08-16 RX ADMIN — LIDOCAINE HYDROCHLORIDE 100 MG: 20 INJECTION, SOLUTION INFILTRATION; PERINEURAL at 11:34

## 2024-08-16 RX ADMIN — DIAZEPAM 2.5 MG: 5 INJECTION, SOLUTION INTRAMUSCULAR; INTRAVENOUS at 01:29

## 2024-08-16 RX ADMIN — GLYCOPYRROLATE 0.2 MG: 0.2 INJECTION INTRAMUSCULAR; INTRAVENOUS at 10:27

## 2024-08-16 RX ADMIN — FAMOTIDINE 20 MG: 10 INJECTION INTRAVENOUS at 01:28

## 2024-08-16 RX ADMIN — ONDANSETRON 4 MG: 2 INJECTION INTRAMUSCULAR; INTRAVENOUS at 11:41

## 2024-08-16 RX ADMIN — Medication 100 MCG: at 11:46

## 2024-08-16 RX ADMIN — SENNOSIDES AND DOCUSATE SODIUM 2 TABLET: 50; 8.6 TABLET ORAL at 20:37

## 2024-08-16 RX ADMIN — FAMOTIDINE 20 MG: 10 INJECTION INTRAVENOUS at 20:31

## 2024-08-16 RX ADMIN — ROCURONIUM BROMIDE 10 MG: 10 INJECTION, SOLUTION INTRAVENOUS at 11:54

## 2024-08-16 RX ADMIN — FENTANYL CITRATE 50 MCG: 50 INJECTION, SOLUTION INTRAMUSCULAR; INTRAVENOUS at 10:27

## 2024-08-16 RX ADMIN — Medication 100 MCG: at 12:03

## 2024-08-16 RX ADMIN — DEXAMETHASONE SODIUM PHOSPHATE 10 MG: 4 INJECTION, SOLUTION INTRAMUSCULAR; INTRAVENOUS at 11:41

## 2024-08-16 RX ADMIN — ROCURONIUM BROMIDE 5 MG: 10 INJECTION, SOLUTION INTRAVENOUS at 11:34

## 2024-08-16 RX ADMIN — FENTANYL CITRATE 50 MCG: 50 INJECTION, SOLUTION INTRAMUSCULAR; INTRAVENOUS at 11:34

## 2024-08-16 RX ADMIN — SODIUM CHLORIDE, POTASSIUM CHLORIDE, SODIUM LACTATE AND CALCIUM CHLORIDE 9 ML/HR: 600; 310; 30; 20 INJECTION, SOLUTION INTRAVENOUS at 10:27

## 2024-08-16 RX ADMIN — ROCURONIUM BROMIDE 15 MG: 10 INJECTION, SOLUTION INTRAVENOUS at 12:24

## 2024-08-16 RX ADMIN — Medication 100 MCG: at 12:37

## 2024-08-16 RX ADMIN — SODIUM CHLORIDE 100 ML/HR: 9 INJECTION, SOLUTION INTRAVENOUS at 04:43

## 2024-08-16 NOTE — LETTER
August 17, 2024     Patient: Morteza Judd   YOB: 1980   Date of Visit: 8/16/2024       To Whom It May Concern:    Per Dr. Peralta, Mr Judd is ok to return back to work on Monday 8/19/2024. No restrictions. Call my office with any questions.    Sincerely,  Ruth VILLALPANDO RN

## 2024-08-16 NOTE — PLAN OF CARE
Goal Outcome Evaluation:      Patient NPO for possible EGD this morning to remove food bolus. PRN pain medication given for discomfort. All questions answered.

## 2024-08-16 NOTE — ED PROVIDER NOTES
EMERGENCY DEPARTMENT ENCOUNTER  Room Number:  02/02  PCP: Provider, No Known  Independent Historians: Patient      HPI:  Chief Complaint: had concerns including Food bolus.     A complete HPI/ROS/PMH/PSH/SH/FH are unobtainable due to: None    Chronic or social conditions impacting patient care (Social Determinants of Health): None      Context: Morteza Judd is a 44 y.o. male with a medical history of alcohol abuse who presents emergency department with esophageal impaction since noon yesterday.  Patient says he was eating chicken and felt like he got stuck in the upper part of his esophagus.  He says this has happened in the past and said the last time he required EGD for retrieval was about a year ago.  He is no longer taking a PPI.  He says he has been unable to tolerate p.o. since this incident occurred.  He denies chest pain, shortness of air, fever, or chills.  He denies nausea at this time.  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    ADHD 10/07/2019     Resolved Ambulatory Problems     Diagnosis Date Noted    No Resolved Ambulatory Problems     Past Medical History:   Diagnosis Date    ADHD (attention deficit hyperactivity disorder)     Alcoholism          PAST SURGICAL HISTORY  Past Surgical History:   Procedure Laterality Date    ENDOSCOPY N/A 12/18/2019    Procedure: ESOPHAGOGASTRODUODENOSCOPY WITH REMOVAL OF FOREIGN BODY;  Surgeon: Tariq Padilla MD;  Location: [facility] OR;  Service: Gastroenterology    NO PAST SURGERIES            FAMILY HISTORY  Family History   Problem Relation Age of Onset    Alcohol abuse Father          SOCIAL HISTORY  Social History     Socioeconomic History    Marital status: Single   Tobacco Use    Smoking status: Every Day     Current packs/day: 0.50     Average packs/day: 0.5 packs/day for 20.0 years (10.0 ttl pk-yrs)     Types: Cigarettes    Smokeless tobacco: Never   Substance and Sexual Activity    Alcohol use: Yes     Comment: occ    Drug use: Not Currently     Types: Cocaine(coke), Marijuana     Comment: too k some pain pills in past 30 days, not a lot and  only on occ    Sexual activity: Defer         ALLERGIES  Patient has no known allergies.      REVIEW OF SYSTEMS  Included in HPI  All systems reviewed and negative except for those discussed in HPI.      PHYSICAL EXAM    I have reviewed the triage vital signs and nursing notes.    ED Triage Vitals   Temp Heart Rate Resp BP SpO2   08/16/24 0038 08/16/24 0038 08/16/24 0038 08/16/24 0039 08/16/24 0038   98 °F (36.7 °C) 108 18 128/95 98 %      Temp src Heart Rate Source Patient Position BP Location FiO2 (%)   08/16/24 0038 08/16/24 0038 -- -- --   Tympanic Monitor          Physical Exam  Constitutional:       Appearance: Normal appearance.      Comments: No acute distress   HENT:      Head: Normocephalic and atraumatic.      Mouth/Throat:      Mouth: Mucous membranes are moist.      Comments: Handling oral secretions well  Eyes:      Extraocular Movements: Extraocular movements intact.      Pupils: Pupils are equal, round, and reactive to light.   Cardiovascular:      Rate and Rhythm: Normal rate and regular rhythm.      Pulses: Normal pulses.      Heart sounds: Normal heart sounds.   Pulmonary:      Effort: Pulmonary effort is normal. No respiratory distress.      Breath sounds: Normal breath sounds. No stridor.   Abdominal:      General: Abdomen is flat. There is no distension.      Palpations: Abdomen is soft.      Tenderness: There is no abdominal  tenderness.   Musculoskeletal:         General: Normal range of motion.      Cervical back: Normal range of motion and neck supple.   Skin:     General: Skin is warm and dry.      Capillary Refill: Capillary refill takes less than 2 seconds.   Neurological:      General: No focal deficit present.      Mental Status: He is alert and oriented to person, place, and time.   Psychiatric:         Mood and Affect: Mood normal.         Behavior: Behavior normal.         LAB RESULTS  Recent Results (from the past 24 hour(s))   Comprehensive Metabolic Panel    Collection Time: 08/16/24  3:14 AM    Specimen: Blood   Result Value Ref Range    Glucose 92 65 - 99 mg/dL    BUN 17 6 - 20 mg/dL    Creatinine 1.03 0.76 - 1.27 mg/dL    Sodium 144 136 - 145 mmol/L    Potassium 4.1 3.5 - 5.2 mmol/L    Chloride 107 98 - 107 mmol/L    CO2 25.6 22.0 - 29.0 mmol/L    Calcium 9.7 8.6 - 10.5 mg/dL    Total Protein 7.6 6.0 - 8.5 g/dL    Albumin 4.3 3.5 - 5.2 g/dL    ALT (SGPT) 23 1 - 41 U/L    AST (SGOT) 18 1 - 40 U/L    Alkaline Phosphatase 81 39 - 117 U/L    Total Bilirubin 0.8 0.0 - 1.2 mg/dL    Globulin 3.3 gm/dL    A/G Ratio 1.3 g/dL    BUN/Creatinine Ratio 16.5 7.0 - 25.0    Anion Gap 11.4 5.0 - 15.0 mmol/L    eGFR 91.3 >60.0 mL/min/1.73   CBC Auto Differential    Collection Time: 08/16/24  3:14 AM    Specimen: Blood   Result Value Ref Range    WBC 8.31 3.40 - 10.80 10*3/mm3    RBC 5.32 4.14 - 5.80 10*6/mm3    Hemoglobin 16.1 13.0 - 17.7 g/dL    Hematocrit 47.3 37.5 - 51.0 %    MCV 88.9 79.0 - 97.0 fL    MCH 30.3 26.6 - 33.0 pg    MCHC 34.0 31.5 - 35.7 g/dL    RDW 12.1 (L) 12.3 - 15.4 %    RDW-SD 39.6 37.0 - 54.0 fl    MPV 8.6 6.0 - 12.0 fL    Platelets 301 140 - 450 10*3/mm3    Neutrophil % 59.9 42.7 - 76.0 %    Lymphocyte % 24.8 19.6 - 45.3 %    Monocyte % 10.0 5.0 - 12.0 %    Eosinophil % 4.5 0.3 - 6.2 %    Basophil % 0.4 0.0 - 1.5 %    Immature Grans % 0.4 0.0 - 0.5 %    Neutrophils, Absolute 4.99 1.70 - 7.00 10*3/mm3    Lymphocytes,  Absolute 2.06 0.70 - 3.10 10*3/mm3    Monocytes, Absolute 0.83 0.10 - 0.90 10*3/mm3    Eosinophils, Absolute 0.37 0.00 - 0.40 10*3/mm3    Basophils, Absolute 0.03 0.00 - 0.20 10*3/mm3    Immature Grans, Absolute 0.03 0.00 - 0.05 10*3/mm3    nRBC 0.0 0.0 - 0.2 /100 WBC           MEDICATIONS GIVEN IN ER  Medications   sodium chloride 0.9 % flush 10 mL (has no administration in time range)   sodium chloride 0.9 % flush 10 mL (has no administration in time range)   sodium chloride 0.9 % infusion 40 mL (has no administration in time range)   sodium chloride 0.9 % infusion (has no administration in time range)   acetaminophen (TYLENOL) tablet 650 mg (has no administration in time range)     Or   acetaminophen (TYLENOL) 160 MG/5ML oral solution 650 mg (has no administration in time range)     Or   acetaminophen (TYLENOL) suppository 650 mg (has no administration in time range)   sennosides-docusate (PERICOLACE) 8.6-50 MG per tablet 2 tablet (has no administration in time range)     And   polyethylene glycol (MIRALAX) packet 17 g (has no administration in time range)     And   bisacodyl (DULCOLAX) EC tablet 5 mg (has no administration in time range)     And   bisacodyl (DULCOLAX) suppository 10 mg (has no administration in time range)   ondansetron ODT (ZOFRAN-ODT) disintegrating tablet 4 mg (has no administration in time range)     Or   ondansetron (ZOFRAN) injection 4 mg (has no administration in time range)   calcium carbonate (TUMS) chewable tablet 500 mg (200 mg elemental) (has no administration in time range)   famotidine (PEPCID) injection 20 mg (has no administration in time range)   diazePAM (VALIUM) injection 2.5 mg (2.5 mg Intravenous Given 8/16/24 0129)   famotidine (PEPCID) injection 20 mg (20 mg Intravenous Given 8/16/24 0128)   ondansetron (ZOFRAN) injection 4 mg (4 mg Intravenous Given 8/16/24 0128)           OUTPATIENT MEDICATION MANAGEMENT:  Current Facility-Administered Medications Ordered in Epic    Medication Dose Route Frequency Provider Last Rate Last Admin    acetaminophen (TYLENOL) tablet 650 mg  650 mg Oral Q4H PRN Laura Burt APRN        Or    acetaminophen (TYLENOL) 160 MG/5ML oral solution 650 mg  650 mg Oral Q4H PRN Laura Burt APRN        Or    acetaminophen (TYLENOL) suppository 650 mg  650 mg Rectal Q4H PRN Laura Burt APRN        sennosides-docusate (PERICOLACE) 8.6-50 MG per tablet 2 tablet  2 tablet Oral BID PRN Laura Burt APRN        And    polyethylene glycol (MIRALAX) packet 17 g  17 g Oral Daily PRN Laura Burt APRN        And    bisacodyl (DULCOLAX) EC tablet 5 mg  5 mg Oral Daily PRN Laura Burt APRN        And    bisacodyl (DULCOLAX) suppository 10 mg  10 mg Rectal Daily PRN Laura Burt APRN        calcium carbonate (TUMS) chewable tablet 500 mg (200 mg elemental)  2 tablet Oral BID PRN Laura Burt APRN        famotidine (PEPCID) injection 20 mg  20 mg Intravenous Q12H Laura Burt APRN        ondansetron ODT (ZOFRAN-ODT) disintegrating tablet 4 mg  4 mg Oral Q6H PRN Laura Burt APRN        Or    ondansetron (ZOFRAN) injection 4 mg  4 mg Intravenous Q6H PRN Laura Burt APRN        sodium chloride 0.9 % flush 10 mL  10 mL Intravenous Q12H Larua Burt APRN        sodium chloride 0.9 % flush 10 mL  10 mL Intravenous PRN Laura Burt APRN        sodium chloride 0.9 % infusion 40 mL  40 mL Intravenous PRN Laura Burt APRN        sodium chloride 0.9 % infusion  100 mL/hr Intravenous Continuous Laura Burt APRN         Current Outpatient Medications Ordered in Epic   Medication Sig Dispense Refill    amphetamine-dextroamphetamine (ADDERALL) 20 MG tablet Take 20 mg by mouth 3 (Three) Times a Day.      buPROPion (WELLBUTRIN) 100 MG tablet Take 300 mg by mouth Daily. ER/XR      chlordiazePOXIDE (LIBRIUM) 25 MG capsule Take 1 capsule by mouth 4 (Four)  Times a Day As Needed for Anxiety. 20 capsule 0    chlordiazePOXIDE (LIBRIUM) 25 MG capsule Take 1 capsule by mouth 3 (Three) Times a Day. 9 capsule 0    dicyclomine (BENTYL) 20 MG tablet Take 1 tablet by mouth Every 6 (Six) Hours As Needed (cramping). 20 tablet 0    ondansetron ODT (ZOFRAN-ODT) 4 MG disintegrating tablet Place 1 tablet on the tongue Every 6 (Six) Hours As Needed for Nausea. 10 tablet 0    sildenafil (VIAGRA) 25 MG tablet Take 25 mg by mouth As Needed for Erectile Dysfunction.             PROGRESS, DATA ANALYSIS, CONSULTS, AND MEDICAL DECISION MAKING  ORDERS PLACED DURING THIS VISIT:  Orders Placed This Encounter   Procedures    Comprehensive Metabolic Panel    CBC Auto Differential    NPO Diet NPO Type: Strict NPO    Vital Signs    Intake & Output    Weigh Patient    Oral Care    Saline Lock & Maintain IV Access    Place Sequential Compression Device    Maintain Sequential Compression Device    Code Status and Medical Interventions: CPR (Attempt to Resuscitate); Full Support    [facility] (on-call MD unless specified) Details    Inpatient Gastroenterology Consult    Insert Peripheral IV    Initiate Observation Status    CBC & Differential       All labs have been independently interpreted by me.  All radiology studies have been reviewed by me. All EKG's have been independently viewed and interpreted by me.  Discussion below represents my analysis of pertinent findings related to patient's condition, differential diagnosis, treatment plan and final disposition.    Differential diagnosis includes but is not limited to:   Esophageal impaction, esophageal stricture, Schatzki's ring, esophagitis, GERD    ED Course:  ED Course as of 08/16/24 0411   Fri Aug 16, 2024   0110 I discussed the case with Dr. Noel and he agrees to evaluate the patient at the bedside.    [CC]   0255 Attempted to p.o. challenge the patient but he immediately vomited.  Will plan to admit for GI consultation. [CC]   8098 Spoke with  SRINIVAS Sanchez with [facility].  Reviewed history, exam, results, treatments.  She agrees admit the patient to Dr. Perrin.      [CC]      ED Course User Index  [CC] Muriel Orantes PA-C           AS OF 04:11 EDT VITALS:    BP - 108/70  HR - 67  TEMP - 98 °F (36.7 °C) (Tympanic)  O2 SATS - 100%      MDM:  Patient is a 44-year-old male presents emergency department today with a suspected esophageal food impaction.  On arrival here in the emergency department vitals are reassuring, he is afebrile.  On my exam the patient is handling oral secretions and appears in no acute distress.  We attempted to treat the patient with muscle relaxers, Pepcid, and Zofran.  He was unable to tolerate p.o.  He will need to be admitted for GI consultation and EGD.  Patient is stable at time of admission.      COMPLEXITY OF CARE  The patient requires admission.        DIAGNOSIS  Final diagnoses:   Esophageal obstruction due to food impaction         DISPOSITION  ED Disposition       ED Disposition   Decision to Admit    Condition   --    Comment   Level of Care: Med/Surg [1]   Diagnosis: Food impaction of esophagus [[id number]]   Admitting Physician: COURTNEY PERRIN [[id number]]   Attending Physician: COURTNEY PERRIN [[id number]]                        Please note that portions of this document were completed with a voice recognition program.    Note Disclaimer: At [facility], we believe that sharing information builds trust and better relationships. You are receiving this note because you recently visited [facility]. It is possible you will see health information before a provider has talked with you about it. This kind of information can be easy to misunderstand. To help you fully understand what it means for your health, we urge you to discuss this note with your provider.     Muriel Orantes PA-C  08/16/24 0413

## 2024-08-16 NOTE — ANESTHESIA PREPROCEDURE EVALUATION
" Anesthesia Evaluation     Patient summary reviewed and Nursing notes reviewed   NPO Solid Status: > 8 hours             Airway   Mallampati: II  TM distance: >3 FB  Neck ROM: full  No difficulty expected  Dental      Pulmonary - normal exam   (+) a smoker Current, Smoked day of surgery,  Cardiovascular - normal exam        Neuro/Psych  (+) psychiatric history ADHD  GI/Hepatic/Renal/Endo      Musculoskeletal     Abdominal  - normal exam   Substance History   (+) alcohol useDrug use: abuses oral pain pills occasionally\".     OB/GYN          Other                      Anesthesia Plan    ASA 3     general     intravenous induction     Anesthetic plan, risks, benefits, and alternatives have been provided, discussed and informed consent has been obtained with: patient.    "

## 2024-08-16 NOTE — H&P
Patient Name:  Morteza Judd  YOB: 1980  MRN:  [id number]  Admit Date:  8/16/2024  Patient Care Team:  Provider, No Known as PCP - General      Subjective   History Present Illness     Chief Complaint   Patient presents with    Food bolus       Mr. Judd is a 44 y.o. smoker with a history of alcohol abuse, ADHD, anxiety, and depression that presents to [facility] complaining of an esophageal impaction. He states he was eating chicken yesterday around noon when a piece got stuck in his throat. He states this has happened to him multiple times in the past and he last required an EGD for retrieval about 2 years ago. He states he has gotten food stuck in his throat since then, but he has been able to get the food to come out on his own. He reports he was previously followed by a gastroenterologist and had an esophageal stretching procedure done but he has not followed up recently. He currently reports a sore throat. He denies fever, chills, shortness of breath, cough, nausea, and vomiting. An attempt was made to resolve the food bolus in the ED by administering Valium to relax the throat muscles, but this was unsuccessful. He has been admitted for GI evaluation.      History of Present Illness  Review of Systems   Constitutional:  Negative for chills and fever.   HENT:  Positive for sore throat and trouble swallowing. Negative for congestion.    Eyes:  Negative for photophobia and visual disturbance.   Respiratory:  Negative for cough, shortness of breath and wheezing.    Cardiovascular:  Negative for chest pain, palpitations and leg swelling.   Gastrointestinal:  Negative for abdominal pain, nausea and vomiting.   Neurological:  Negative for dizziness, light-headedness, numbness and headaches.        Personal History     Past Medical History:   Diagnosis Date    ADHD (attention deficit hyperactivity disorder)     Alcoholism      Past Surgical History:   Procedure Laterality Date     ENDOSCOPY N/A 12/18/2019    Procedure: ESOPHAGOGASTRODUODENOSCOPY WITH REMOVAL OF FOREIGN BODY;  Surgeon: Tariq Padilla MD;  Location: [facility];  Service: Gastroenterology    NO PAST SURGERIES       Family History   Problem Relation Age of Onset    Alcohol abuse Father      Social History     Tobacco Use    Smoking status: Every Day     Current packs/day: 0.50     Average packs/day: 0.5 packs/day for 20.0 years (10.0 ttl pk-yrs)     Types: Cigarettes    Smokeless tobacco: Never   Vaping Use    Vaping status: Never Used    Passive vaping exposure: Yes   Substance Use Topics    Alcohol use: Yes     Comment: occ    Drug use: Not Currently     Types: Cocaine(coke), Marijuana     Comment: too k some pain pills in past 30 days, not a lot and  only on occ     Medications Prior to Admission   Medication Sig Dispense Refill Last Dose    amphetamine-dextroamphetamine (ADDERALL) 20 MG tablet Take 1 tablet by mouth 3 (Three) Times a Day.       buPROPion (WELLBUTRIN) 100 MG tablet Take 3 tablets by mouth Daily. ER/XR       chlordiazePOXIDE (LIBRIUM) 25 MG capsule Take 1 capsule by mouth 4 (Four) Times a Day As Needed for Anxiety. 20 capsule 0     chlordiazePOXIDE (LIBRIUM) 25 MG capsule Take 1 capsule by mouth 3 (Three) Times a Day. 9 capsule 0     dicyclomine (BENTYL) 20 MG tablet Take 1 tablet by mouth Every 6 (Six) Hours As Needed (cramping). 20 tablet 0     ondansetron ODT (ZOFRAN-ODT) 4 MG disintegrating tablet Place 1 tablet on the tongue Every 6 (Six) Hours As Needed for Nausea. 10 tablet 0     sildenafil (VIAGRA) 25 MG tablet Take 1 tablet by mouth As Needed for Erectile Dysfunction.        Allergies:  No Known Allergies    Objective    Objective     Vital Signs  Temp:  [98 °F (36.7 °C)] 98 °F (36.7 °C)  Heart Rate:  [] 67  Resp:  [18] 18  BP: (108-128)/(70-95) 108/70  SpO2:  [97 %-100 %] 100 %  on   ;   Device (Oxygen Therapy): room air  Body mass index is 24.28 kg/m².    Physical Exam  Vitals and nursing  note reviewed.   HENT:      Head: Normocephalic and atraumatic.      Nose: Nose normal.      Mouth/Throat:      Mouth: Mucous membranes are moist.      Tongue: Tongue does not deviate from midline.      Comments: He is handling his oral secretions well  Eyes:      Extraocular Movements: Extraocular movements intact.      Conjunctiva/sclera: Conjunctivae normal.   Cardiovascular:      Rate and Rhythm: Normal rate and regular rhythm.      Pulses: Normal pulses.      Heart sounds: Normal heart sounds.   Pulmonary:      Effort: Pulmonary effort is normal.      Breath sounds: Normal breath sounds.   Abdominal:      General: Bowel sounds are normal.      Palpations: Abdomen is soft.   Musculoskeletal:         General: Normal range of motion.      Cervical back: Normal range of motion and neck supple. Tenderness present.   Skin:     General: Skin is warm and dry.   Neurological:      General: No focal deficit present.      Mental Status: He is alert and oriented to person, place, and time.   Psychiatric:         Mood and Affect: Mood normal.         Behavior: Behavior normal.         Results Review:  I reviewed the patient's new clinical results.  I reviewed the patient's new imaging results and agree with the interpretation.  I reviewed the patient's other test results and agree with the interpretation  I personally viewed and interpreted the patient's EKG/Telemetry data  Discussed with ED provider.    Lab Results (last 24 hours)       Procedure Component Value Units Date/Time    CBC & Differential [[id number]]  (Abnormal) Collected: 08/16/24 0314    Specimen: Blood Updated: 08/16/24 0328    Narrative:      The following orders were created for panel order CBC & Differential.  Procedure                               Abnormality         Status                     ---------                               -----------         ------                     CBC Auto Differential[[id number]]        Abnormal            Final result                  Please view results for these tests on the individual orders.    Comprehensive Metabolic Panel [[id number]] Collected: 08/16/24 0314    Specimen: Blood Updated: 08/16/24 0348     Glucose 92 mg/dL      BUN 17 mg/dL      Creatinine 1.03 mg/dL      Sodium 144 mmol/L      Potassium 4.1 mmol/L      Chloride 107 mmol/L      CO2 25.6 mmol/L      Calcium 9.7 mg/dL      Total Protein 7.6 g/dL      Albumin 4.3 g/dL      ALT (SGPT) 23 U/L      AST (SGOT) 18 U/L      Alkaline Phosphatase 81 U/L      Total Bilirubin 0.8 mg/dL      Globulin 3.3 gm/dL      A/G Ratio 1.3 g/dL      BUN/Creatinine Ratio 16.5     Anion Gap 11.4 mmol/L      eGFR 91.3 mL/min/1.73     Narrative:      GFR Normal >60  Chronic Kidney Disease <60  Kidney Failure <15      CBC Auto Differential [[id number]]  (Abnormal) Collected: 08/16/24 0314    Specimen: Blood Updated: 08/16/24 0328     WBC 8.31 10*3/mm3      RBC 5.32 10*6/mm3      Hemoglobin 16.1 g/dL      Hematocrit 47.3 %      MCV 88.9 fL      MCH 30.3 pg      MCHC 34.0 g/dL      RDW 12.1 %      RDW-SD 39.6 fl      MPV 8.6 fL      Platelets 301 10*3/mm3      Neutrophil % 59.9 %      Lymphocyte % 24.8 %      Monocyte % 10.0 %      Eosinophil % 4.5 %      Basophil % 0.4 %      Immature Grans % 0.4 %      Neutrophils, Absolute 4.99 10*3/mm3      Lymphocytes, Absolute 2.06 10*3/mm3      Monocytes, Absolute 0.83 10*3/mm3      Eosinophils, Absolute 0.37 10*3/mm3      Basophils, Absolute 0.03 10*3/mm3      Immature Grans, Absolute 0.03 10*3/mm3      nRBC 0.0 /100 WBC             Imaging Results (Last 24 Hours)       ** No results found for the last 24 hours. **                No orders to display        Assessment/Plan     Active Hospital Problems    Diagnosis  POA    **Food impaction of esophagus [T18.128A, W44.F3XA]  Yes    Anxiety associated with depression [F41.8]  Unknown    ADHD [F90.9]  Yes    Tobacco use disorder [F17.200]  Yes       Food impaction of esophagus  -GI consult  -Keep NPO  -He is  currently handling his oral sections well. Monitor airway closely  -IV Pepcid q 12 H  -PRN analgesia  -Repeat labs in AM    ADHD  -Hold stimulants    Depression  Anxiety  -Hold Wellbutrin    History of ETOH abuse  -Check ETOH level    Tobacco use disorder  -Nicotine patch    -Will address home medications once med rec is completed.    -I discussed the patients findings and my recommendations with patient.    VTE Prophylaxis - SCDs.  Code Status - Full code.       GRACE Holland  [facility]  08/16/24  05:08 EDT

## 2024-08-16 NOTE — ANESTHESIA PROCEDURE NOTES
Airway  Urgency: elective    Date/Time: 8/16/2024 11:37 AM  Airway not difficult    General Information and Staff    Patient location during procedure: OR  Anesthesiologist: Zack Amin MD  CRNA/CAA: Abbey Hardy CRNA    Indications and Patient Condition  Indications for airway management: airway protection    Preoxygenated: yes  Mask difficulty assessment: 0 - not attempted    Final Airway Details  Final airway type: endotracheal airway      Successful airway: ETT  Cuffed: yes   Successful intubation technique: direct laryngoscopy  Facilitating devices/methods: intubating stylet and cricoid pressure  Endotracheal tube insertion site: oral  Blade: Car  Blade size: 2  ETT size (mm): 7.5  Cormack-Lehane Classification: grade I - full view of glottis  Placement verified by: chest auscultation and capnometry   Measured from: lips  ETT/EBT  to lips (cm): 23  Number of attempts at approach: 1  Assessment: lips, teeth, and gum same as pre-op and atraumatic intubation    Additional Comments  Atraumatic, MOP to cuff, BSBE, no change to dentition, secured with tape

## 2024-08-16 NOTE — ED PROVIDER NOTES
MD ATTESTATION NOTE    SHARED VISIT: This visit was performed by BOTH a physician and an APC. The substantive portion of the medical decision making was performed by this attesting physician who made or approved the management plan and takes responsibility for patient management. All studies documented in the APC note (if performed) were independently interpreted by me.    The MARY and I have discussed this patient's history, physical exam, MDM, and treatment plan.  I have reviewed the documentation and personally had a face to face interaction with the patient. The attached note describes my personal findings.      Morteza Judd is a 44 y.o. male who presents to the ED c/o acute impacted food bolus.  Patient states had multiple episodes of food getting stuck in his throat.  States eating chicken around noon after eating breaded chicken states he felt it stuck in his throat.  States unable to tolerate p.o. since then.  States he can feel where it is stuck.  No shortness of breath or chest pain at this time.  Patient recently controlling his secretions.    On exam:  GENERAL: not distressed  HENT: nares patent  EYES: no scleral icterus  CV: regular rhythm, regular rate  RESPIRATORY: normal effort  ABDOMEN: soft  MUSCULOSKELETAL: no deformity  NEURO: alert, moves all extremities, follows commands  SKIN: warm, dry    Labs  Recent Results (from the past 24 hour(s))   Comprehensive Metabolic Panel    Collection Time: 08/16/24  3:14 AM    Specimen: Blood   Result Value Ref Range    Glucose 92 65 - 99 mg/dL    BUN 17 6 - 20 mg/dL    Creatinine 1.03 0.76 - 1.27 mg/dL    Sodium 144 136 - 145 mmol/L    Potassium 4.1 3.5 - 5.2 mmol/L    Chloride 107 98 - 107 mmol/L    CO2 25.6 22.0 - 29.0 mmol/L    Calcium 9.7 8.6 - 10.5 mg/dL    Total Protein 7.6 6.0 - 8.5 g/dL    Albumin 4.3 3.5 - 5.2 g/dL    ALT (SGPT) 23 1 - 41 U/L    AST (SGOT) 18 1 - 40 U/L    Alkaline Phosphatase 81 39 - 117 U/L    Total Bilirubin 0.8 0.0 - 1.2 mg/dL     Globulin 3.3 gm/dL    A/G Ratio 1.3 g/dL    BUN/Creatinine Ratio 16.5 7.0 - 25.0    Anion Gap 11.4 5.0 - 15.0 mmol/L    eGFR 91.3 >60.0 mL/min/1.73   CBC Auto Differential    Collection Time: 08/16/24  3:14 AM    Specimen: Blood   Result Value Ref Range    WBC 8.31 3.40 - 10.80 10*3/mm3    RBC 5.32 4.14 - 5.80 10*6/mm3    Hemoglobin 16.1 13.0 - 17.7 g/dL    Hematocrit 47.3 37.5 - 51.0 %    MCV 88.9 79.0 - 97.0 fL    MCH 30.3 26.6 - 33.0 pg    MCHC 34.0 31.5 - 35.7 g/dL    RDW 12.1 (L) 12.3 - 15.4 %    RDW-SD 39.6 37.0 - 54.0 fl    MPV 8.6 6.0 - 12.0 fL    Platelets 301 140 - 450 10*3/mm3    Neutrophil % 59.9 42.7 - 76.0 %    Lymphocyte % 24.8 19.6 - 45.3 %    Monocyte % 10.0 5.0 - 12.0 %    Eosinophil % 4.5 0.3 - 6.2 %    Basophil % 0.4 0.0 - 1.5 %    Immature Grans % 0.4 0.0 - 0.5 %    Neutrophils, Absolute 4.99 1.70 - 7.00 10*3/mm3    Lymphocytes, Absolute 2.06 0.70 - 3.10 10*3/mm3    Monocytes, Absolute 0.83 0.10 - 0.90 10*3/mm3    Eosinophils, Absolute 0.37 0.00 - 0.40 10*3/mm3    Basophils, Absolute 0.03 0.00 - 0.20 10*3/mm3    Immature Grans, Absolute 0.03 0.00 - 0.05 10*3/mm3    nRBC 0.0 0.0 - 0.2 /100 WBC   Ethanol    Collection Time: 08/16/24  5:55 AM    Specimen: Blood   Result Value Ref Range    Ethanol <10 0 - 10 mg/dL    Ethanol % <0.010 %       Radiology  No Radiology Exams Resulted Within Past 24 Hours    Medications given in the ED:  Medications   sodium chloride 0.9 % flush 10 mL (has no administration in time range)   sodium chloride 0.9 % flush 10 mL (has no administration in time range)   sodium chloride 0.9 % infusion 40 mL (has no administration in time range)   sodium chloride 0.9 % infusion (100 mL/hr Intravenous New Bag 8/16/24 0443)   acetaminophen (TYLENOL) tablet 650 mg (has no administration in time range)     Or   acetaminophen (TYLENOL) 160 MG/5ML oral solution 650 mg (has no administration in time range)     Or   acetaminophen (TYLENOL) suppository 650 mg (has no administration in  time range)   sennosides-docusate (PERICOLACE) 8.6-50 MG per tablet 2 tablet (has no administration in time range)     And   polyethylene glycol (MIRALAX) packet 17 g (has no administration in time range)     And   bisacodyl (DULCOLAX) EC tablet 5 mg (has no administration in time range)     And   bisacodyl (DULCOLAX) suppository 10 mg (has no administration in time range)   ondansetron ODT (ZOFRAN-ODT) disintegrating tablet 4 mg (has no administration in time range)     Or   ondansetron (ZOFRAN) injection 4 mg (has no administration in time range)   calcium carbonate (TUMS) chewable tablet 500 mg (200 mg elemental) (has no administration in time range)   famotidine (PEPCID) injection 20 mg (has no administration in time range)   nicotine (NICODERM CQ) 14 MG/24HR patch 1 patch (has no administration in time range)   diazePAM (VALIUM) injection 2.5 mg (2.5 mg Intravenous Given 8/16/24 0129)   famotidine (PEPCID) injection 20 mg (20 mg Intravenous Given 8/16/24 0128)   ondansetron (ZOFRAN) injection 4 mg (4 mg Intravenous Given 8/16/24 0128)   morphine injection 2 mg (2 mg Intravenous Given 8/16/24 0511)       Orders placed during this visit:  Orders Placed This Encounter   Procedures    Comprehensive Metabolic Panel    CBC Auto Differential    Ethanol    NPO Diet NPO Type: Strict NPO    Vital Signs    Intake & Output    Weigh Patient    Oral Care    Saline Lock & Maintain IV Access    Place Sequential Compression Device    Maintain Sequential Compression Device    Code Status and Medical Interventions: CPR (Attempt to Resuscitate); Full Support    [facility] (on-call MD unless specified) Details    Inpatient Gastroenterology Consult    Inpatient Case Management  Consult    Insert Peripheral IV    Initiate Observation Status    CBC & Differential       Medical Decision Making:  ED Course as of 08/16/24 0716   Fri Aug 16, 2024   0110 I discussed the case with Dr. Noel and he agrees to evaluate the patient  at the bedside.    [CC]   0255 Attempted to p.o. challenge the patient but he immediately vomited.  Will plan to admit for GI consultation. [CC]   0328 Spoke with SRINIVAS Sanchez with [facility].  Reviewed history, exam, results, treatments.  She agrees admit the patient to Dr. Gann.      [CC]      ED Course User Index  [CC] Muriel Orantes, PAMelaC       Differential diagnosis:  Impacted food bolus, globus sensation, esophageal spasm    Diagnosis  Final diagnoses:   Esophageal obstruction due to food impaction          Kana Noel MD  08/16/24 0786

## 2024-08-16 NOTE — PLAN OF CARE
Goal Outcome Evaluation:  Plan of Care Reviewed With: patient        Progress: improving  Outcome Evaluation: EGD completed this shift. Tolerating full liquid diet. Receiving NS 100mL/hr. VSS. Anticipate d/c tomorrow.

## 2024-08-16 NOTE — CONSULTS
[facility]   Consult Note    Patient Name: Morteza Judd  : 1980  MRN: [id number]  Primary Care Physician:  Provider, No Known  Referring Physician: Kana RUBY MD  Date of admission: 2024    Inpatient Gastroenterology Consult  Consult performed by: Tariq Padilla MD  Consult ordered by: Laura Burt APRN        Subjective   Subjective     Reason for Consult/ Chief Complaint: inability to swallow    History of Present Illness  Morteza Judd is a 44 y.o. male  has had progressive difficulty swallowing solids was eating chicken yesterday and had difficulty with it getting stuck.  This happened noon yesterday.  He normally can jump up and down and food will pass. He had similary presentation 2019.  At that time I removed bolus. He was told to come back for dilation. Despite phone attempts, and certified letter ( which he did not get ) this did not happen.   No fever or neck pain     Review of Systems   HENT:  Positive for trouble swallowing.         Personal History     Past Medical History:   Diagnosis Date    ADHD (attention deficit hyperactivity disorder)     Alcoholism        Past Surgical History:   Procedure Laterality Date    ENDOSCOPY N/A 2019    Procedure: ESOPHAGOGASTRODUODENOSCOPY WITH REMOVAL OF FOREIGN BODY;  Surgeon: Tariq Padilla MD;  Location: [facility];  Service: Gastroenterology    NO PAST SURGERIES         Family History: family history includes Alcohol abuse in his father. Otherwise pertinent FHx was reviewed and not pertinent to current issue.    Social History:  reports that he has been smoking cigarettes. He has a 10 pack-year smoking history. He has never used smokeless tobacco. He reports current alcohol use. He reports that he does not currently use drugs after having used the following drugs: Cocaine(coke) and Marijuana.    Home Medications:   amphetamine-dextroamphetamine, buPROPion, chlordiazePOXIDE, dicyclomine, ondansetron ODT, and  sildenafil    Allergies:  No Known Allergies    Objective    Objective     Vitals:  Temp:  [97.5 °F (36.4 °C)-98 °F (36.7 °C)] 97.5 °F (36.4 °C)  Heart Rate:  [] 87  Resp:  [16-18] 16  BP: (105-128)/(70-95) 123/81    Physical Exam  HENT:      Right Ear: External ear normal.      Left Ear: External ear normal.      Mouth/Throat:      Pharynx: Oropharynx is clear.   Eyes:      Conjunctiva/sclera: Conjunctivae normal.   Cardiovascular:      Rate and Rhythm: Normal rate.      Pulses: Normal pulses.   Pulmonary:      Effort: Pulmonary effort is normal.   Abdominal:      General: Abdomen is flat.   Skin:     General: Skin is warm.   Neurological:      General: No focal deficit present.      Mental Status: He is alert.   Psychiatric:         Mood and Affect: Mood normal.         Result Review    Result Review:  I have personally reviewed the results from the time of this admission to 8/16/2024 11:21 EDT and agree with these findings:  []  Laboratory list / accordion  []  Microbiology  []  Radiology  []  EKG/Telemetry   []  Cardiology/Vascular   []  Pathology  []  Old records  []  Other:  Most notable findings include:       Assessment & Plan   Assessment / Plan     Brief Patient Summary:  Morteza Judd is a 44 y.o. male who   Esophageal obstruction from food bolus  Suspected eosinophillic esophagitis     Active Hospital Problems:  Active Hospital Problems    Diagnosis     **Food impaction of esophagus     Anxiety associated with depression     Esophageal obstruction due to food impaction     ADHD     Tobacco use disorder      Plan: Upper tract endoscopy emergently in OR with general anesthesia , risks, alternatives and benefits discussed with patient and patient is agreeable to proceed      Tariq Padilla MD

## 2024-08-16 NOTE — ANESTHESIA POSTPROCEDURE EVALUATION
"Patient: Morteza Judd    Procedure Summary       Date: 08/16/24 Room / Location: [facility] OR [address] [place] [address] MAIN OR    Anesthesia Start: 1131 Anesthesia Stop: 1310    Procedure: ESOPHAGOGASTRODUODENOSCOPY (Esophagus) Diagnosis:       Esophageal obstruction due to food impaction      (Esophageal obstruction due to food impaction [T18.128A, W44.F3XA])    Surgeons: Tariq Padilla MD Provider: Zack Amin MD    Anesthesia Type: general ASA Status: 3            Anesthesia Type: general    Vitals  Vitals Value Taken Time   /75 08/16/24 1330   Temp 36.4 °C (97.6 °F) 08/16/24 1307   Pulse 64 08/16/24 1330   Resp 16 08/16/24 1315   SpO2 99 % 08/16/24 1330   Vitals shown include unfiled device data.        Post Anesthesia Care and Evaluation    Patient location during evaluation: bedside  Level of consciousness: awake  Pain management: adequate    Airway patency: patent  Anesthetic complications: No anesthetic complications    Cardiovascular status: acceptable  Respiratory status: acceptable  Hydration status: acceptable    Comments: */74   Pulse 67   Temp 36.4 °C (97.6 °F) (Oral)   Resp 16   Ht 170.2 cm (67\")   Wt 64 kg (141 lb 1.5 oz)   SpO2 100%   BMI 22.10 kg/m²       "

## 2024-08-16 NOTE — ED NOTES
Pt arrived to ER from home via pv, c/o of food bolus, pt stated this has happened in the past.  Per pt, he was eating chicken around noon and first bite got stuck.

## 2024-08-17 VITALS
WEIGHT: 141.09 LBS | OXYGEN SATURATION: 100 % | TEMPERATURE: 98.1 F | DIASTOLIC BLOOD PRESSURE: 70 MMHG | HEART RATE: 72 BPM | BODY MASS INDEX: 22.15 KG/M2 | HEIGHT: 67 IN | SYSTOLIC BLOOD PRESSURE: 102 MMHG | RESPIRATION RATE: 16 BRPM

## 2024-08-17 PROBLEM — K20.0 EOSINOPHILIC ESOPHAGITIS: Status: ACTIVE | Noted: 2024-08-17

## 2024-08-17 LAB
ALBUMIN SERPL-MCNC: 3.6 G/DL (ref 3.5–5.2)
ALBUMIN/GLOB SERPL: 1.4 G/DL
ALP SERPL-CCNC: 68 U/L (ref 39–117)
ALT SERPL W P-5'-P-CCNC: 20 U/L (ref 1–41)
ANION GAP SERPL CALCULATED.3IONS-SCNC: 8.3 MMOL/L (ref 5–15)
AST SERPL-CCNC: 18 U/L (ref 1–40)
BASOPHILS # BLD AUTO: 0.02 10*3/MM3 (ref 0–0.2)
BASOPHILS NFR BLD AUTO: 0.1 % (ref 0–1.5)
BILIRUB SERPL-MCNC: 0.6 MG/DL (ref 0–1.2)
BUN SERPL-MCNC: 15 MG/DL (ref 6–20)
BUN/CREAT SERPL: 16 (ref 7–25)
CALCIUM SPEC-SCNC: 8.5 MG/DL (ref 8.6–10.5)
CHLORIDE SERPL-SCNC: 104 MMOL/L (ref 98–107)
CO2 SERPL-SCNC: 23.7 MMOL/L (ref 22–29)
CREAT SERPL-MCNC: 0.94 MG/DL (ref 0.76–1.27)
DEPRECATED RDW RBC AUTO: 38.3 FL (ref 37–54)
EGFRCR SERPLBLD CKD-EPI 2021: 101.9 ML/MIN/1.73
EOSINOPHIL # BLD AUTO: 0.02 10*3/MM3 (ref 0–0.4)
EOSINOPHIL NFR BLD AUTO: 0.1 % (ref 0.3–6.2)
ERYTHROCYTE [DISTWIDTH] IN BLOOD BY AUTOMATED COUNT: 11.9 % (ref 12.3–15.4)
GLOBULIN UR ELPH-MCNC: 2.6 GM/DL
GLUCOSE SERPL-MCNC: 137 MG/DL (ref 65–99)
HCT VFR BLD AUTO: 42 % (ref 37.5–51)
HGB BLD-MCNC: 13.9 G/DL (ref 13–17.7)
IMM GRANULOCYTES # BLD AUTO: 0.09 10*3/MM3 (ref 0–0.05)
IMM GRANULOCYTES NFR BLD AUTO: 0.6 % (ref 0–0.5)
LYMPHOCYTES # BLD AUTO: 1.17 10*3/MM3 (ref 0.7–3.1)
LYMPHOCYTES NFR BLD AUTO: 8.2 % (ref 19.6–45.3)
MCH RBC QN AUTO: 29.3 PG (ref 26.6–33)
MCHC RBC AUTO-ENTMCNC: 33.1 G/DL (ref 31.5–35.7)
MCV RBC AUTO: 88.6 FL (ref 79–97)
MONOCYTES # BLD AUTO: 0.9 10*3/MM3 (ref 0.1–0.9)
MONOCYTES NFR BLD AUTO: 6.3 % (ref 5–12)
NEUTROPHILS NFR BLD AUTO: 12.11 10*3/MM3 (ref 1.7–7)
NEUTROPHILS NFR BLD AUTO: 84.7 % (ref 42.7–76)
NRBC BLD AUTO-RTO: 0 /100 WBC (ref 0–0.2)
PLATELET # BLD AUTO: 272 10*3/MM3 (ref 140–450)
PMV BLD AUTO: 9 FL (ref 6–12)
POTASSIUM SERPL-SCNC: 3.9 MMOL/L (ref 3.5–5.2)
PROT SERPL-MCNC: 6.2 G/DL (ref 6–8.5)
RBC # BLD AUTO: 4.74 10*6/MM3 (ref 4.14–5.8)
SODIUM SERPL-SCNC: 136 MMOL/L (ref 136–145)
WBC NRBC COR # BLD AUTO: 14.31 10*3/MM3 (ref 3.4–10.8)

## 2024-08-17 PROCEDURE — 85025 COMPLETE CBC W/AUTO DIFF WBC: CPT | Performed by: INTERNAL MEDICINE

## 2024-08-17 PROCEDURE — 80053 COMPREHEN METABOLIC PANEL: CPT | Performed by: INTERNAL MEDICINE

## 2024-08-17 PROCEDURE — G0378 HOSPITAL OBSERVATION PER HR: HCPCS

## 2024-08-17 PROCEDURE — 25810000003 SODIUM CHLORIDE 0.9 % SOLUTION: Performed by: NURSE PRACTITIONER

## 2024-08-17 RX ORDER — PANTOPRAZOLE SODIUM 40 MG/1
40 TABLET, DELAYED RELEASE ORAL DAILY
Qty: 30 TABLET | Refills: 0 | Status: SHIPPED | OUTPATIENT
Start: 2024-08-17

## 2024-08-17 RX ADMIN — SODIUM CHLORIDE 100 ML/HR: 9 INJECTION, SOLUTION INTRAVENOUS at 01:17

## 2024-08-17 RX ADMIN — Medication 10 ML: at 08:48

## 2024-08-17 RX ADMIN — FAMOTIDINE 20 MG: 10 INJECTION INTRAVENOUS at 08:48

## 2024-08-17 RX ADMIN — NICOTINE 1 PATCH: 14 PATCH TRANSDERMAL at 08:49

## 2024-08-17 NOTE — NURSING NOTE
Discharge education completed at bedside by this RN. Per pt's request, will go to  his medication, at the pharmacy, on his way out. Pt also requested to be dropped on the 1st floor where he will wait for his ride, his mother. Pt alert and oriented x4.

## 2024-08-17 NOTE — DISCHARGE SUMMARY
Patient Name: Morteza Judd  : 1980  MRN: [id number]    Date of Admission: 2024  Date of Discharge:  2024  Primary Care Physician: Provider, No Known      Chief Complaint:   Food bolus      Discharge Diagnoses     Active Hospital Problems    Diagnosis  POA    **Food impaction of esophagus [T18.128A, W44.F3XA]  Yes    Eosinophilic esophagitis [K20.0]  Yes    Anxiety associated with depression [F41.8]  Yes    Esophageal obstruction due to food impaction [T18.128A, W44.F3XA]  Yes    ADHD [F90.9]  Yes    Tobacco use disorder [F17.200]  Yes      Resolved Hospital Problems   No resolved problems to display.        Hospital Course     Mr. Judd is a 44 y.o. male with a history of previous esophageal obstruction from food bolus, alcohol use, anxiety/depression who presented to [facility] initially complaining of inability to swallow.  Please see the admitting history and physical for further details.  He was found to have esophageal obstruction due to food bolus and was admitted to the hospital for further evaluation and treatment.  GI consulted and the patient was taken for an EGD on 2024.  He was found to have food bolus which was removed as well as evidence of eosinophilic esophagitis.  The patient has been instructed to continue liquid diet for the next 2 weeks and follow-up with GI for dilation at that time.  He will start pantoprazole daily.  He should also follow-up with his PCP in a week for posthospital follow-up.    Day of Discharge     Subjective:  Resting in bed, did fine with breakfast. Really wants to go home.     Physical Exam:  Temp:  [97.6 °F (36.4 °C)-98.1 °F (36.7 °C)] 98.1 °F (36.7 °C)  Heart Rate:  [55-87] 72  Resp:  [16-18] 16  BP: ()/(51-82) 102/70  Body mass index is 22.1 kg/m².  Physical Exam  Constitutional:       General: He is not in acute distress.     Appearance: Normal appearance. He is not toxic-appearing.   Pulmonary:      Effort: Pulmonary  "effort is normal. No respiratory distress.   Skin:     General: Skin is warm and dry.   Neurological:      General: No focal deficit present.      Mental Status: He is alert and oriented to person, place, and time. Mental status is at baseline.      Motor: No weakness.         Consultants     Consult Orders (all) (From admission, onward)       Start     Ordered    08/16/24 0433  Inpatient Case Management  Consult  Once        Provider:  (Not yet assigned)    08/16/24 0432 08/16/24 0317  Inpatient Gastroenterology Consult  Once        Specialty:  Gastroenterology  Provider:  Kendall Talavera MD    08/16/24 0318 08/16/24 0248  [facility] (on-call MD unless specified) Details  Once        Specialty:  Hospitalist  Provider:  (Not yet assigned)    08/16/24 0247                  Procedures     Imaging Results (All)       None            Pertinent Labs     Results from last 7 days   Lab Units 08/17/24  0804 08/16/24  0314   WBC 10*3/mm3 14.31* 8.31   HEMOGLOBIN g/dL 13.9 16.1   PLATELETS 10*3/mm3 272 301     Results from last 7 days   Lab Units 08/17/24  0804 08/16/24  0314   SODIUM mmol/L 136 144   POTASSIUM mmol/L 3.9 4.1   CHLORIDE mmol/L 104 107   CO2 mmol/L 23.7 25.6   BUN mg/dL 15 17   CREATININE mg/dL 0.94 1.03   GLUCOSE mg/dL 137* 92   Estimated Creatinine Clearance: 89.8 mL/min (by C-G formula based on SCr of 0.94 mg/dL).  Results from last 7 days   Lab Units 08/17/24  0804 08/16/24  0314   ALBUMIN g/dL 3.6 4.3   BILIRUBIN mg/dL 0.6 0.8   ALK PHOS U/L 68 81   AST (SGOT) U/L 18 18   ALT (SGPT) U/L 20 23     Results from last 7 days   Lab Units 08/17/24  0804 08/16/24  0314   CALCIUM mg/dL 8.5* 9.7   ALBUMIN g/dL 3.6 4.3               Invalid input(s): \"LDLCALC\"        Test Results Pending at Discharge       Discharge Details        Discharge Medications        New Medications        Instructions Start Date   pantoprazole 40 MG EC tablet  Commonly known as: PROTONIX   40 mg, Oral, Daily         "     Continue These Medications        Instructions Start Date   amphetamine-dextroamphetamine 20 MG tablet  Commonly known as: ADDERALL   20 mg, Oral, 3 Times Daily      buPROPion 100 MG tablet  Commonly known as: WELLBUTRIN   300 mg, Oral, Daily, ER/XR      chlordiazePOXIDE 25 MG capsule  Commonly known as: LIBRIUM   25 mg, Oral, 4 Times Daily PRN      chlordiazePOXIDE 25 MG capsule  Commonly known as: LIBRIUM   25 mg, Oral, 3 Times Daily      dicyclomine 20 MG tablet  Commonly known as: BENTYL   20 mg, Oral, Every 6 Hours PRN      ondansetron ODT 4 MG disintegrating tablet  Commonly known as: ZOFRAN-ODT   4 mg, Translingual, Every 6 Hours PRN      sildenafil 25 MG tablet  Commonly known as: VIAGRA   25 mg, Oral, As Needed               No Known Allergies      Discharge Disposition:  Home or Self Care    Discharge Diet:  Diet Order   Procedures    Diet: Liquid; Full Liquid; Fluid Consistency: Thin (IDDSI 0)       Discharge Activity:   Activity Instructions       Activity as Tolerated              CODE STATUS:    Code Status and Medical Interventions: CPR (Attempt to Resuscitate); Full Support   Ordered at: 08/16/24 0318     Code Status (Patient has no pulse and is not breathing):    CPR (Attempt to Resuscitate)     Medical Interventions (Patient has pulse or is breathing):    Full Support       No future appointments.  Additional Instructions for the Follow-ups that You Need to Schedule       Discharge Follow-up with PCP   As directed       Currently Documented PCP:    Kingsley, No Known    PCP Phone Number:    [phone]     Follow Up Details: post-hospital follow up               Follow-up Information       Provider, No Known .    Why: post-hospital follow up  Contact information:  [facility]  [place] [address]  [phone]                             Additional Instructions for the Follow-ups that You Need to Schedule       Discharge Follow-up with PCP   As directed       Currently Documented PCP:     Provider, No Known    PCP Phone Number:    [phone]     Follow Up Details: post-hospital follow up            Time Spent on Discharge:  I spent greater than 30 minutes on this discharge activity which included: face-to-face encounter with the patient, reviewing the data in the system, coordination of the care with the nursing staff as well as consultants, documentation, and entering orders.       Belinda Peralta MD  [facility]  08/17/24  08:53 EDT

## 2024-08-19 NOTE — CASE MANAGEMENT/SOCIAL WORK
Case Management Discharge Note      Final Note: Dc home         Selected Continued Care - Discharged on 8/17/2024 Admission date: 8/16/2024 - Discharge disposition: Home or Self Care      Destination    No services have been selected for the patient.                Durable Medical Equipment    No services have been selected for the patient.                Dialysis/Infusion    No services have been selected for the patient.                Home Medical Care    No services have been selected for the patient.                Therapy    No services have been selected for the patient.                Community Resources    No services have been selected for the patient.                Community & DME    No services have been selected for the patient.                         Final Discharge Disposition Code: 01 - home or self-care

## (undated) DEVICE — SENSR O2 OXIMAX FNGR A/ 18IN NONSTR

## (undated) DEVICE — THE DISPOSABLE RAPTOR GRASPING DEVICE IS USED TO GRASP TISSUE AND/OR RETRIEVE FOREIGN BODIES, EXCISED TISSUE AND STENTS DURING ENDOSCOPIC PROCEDURES.: Brand: RAPTOR

## (undated) DEVICE — KT ORCA ORCAPOD DISP STRL

## (undated) DEVICE — FRCP GRASP CAESER 5.5F 240CM DISP STRL

## (undated) DEVICE — ADAPT CLN BIOGUARD AIR/H2O DISP

## (undated) DEVICE — TUBING, SUCTION, 1/4" X 10', STRAIGHT: Brand: MEDLINE

## (undated) DEVICE — CANN NASL CO2 TRULINK W/O2 A/

## (undated) DEVICE — NET RETRV ROTHNET SELCT 2.5MM 3X6X230CM NS

## (undated) DEVICE — CANN O2 ETCO2 FITS ALL CONN CO2 SMPL A/ 7IN DISP LF

## (undated) DEVICE — BITEBLOCK OMNI BLOC

## (undated) DEVICE — KT VLV BIOGUARD SXN BIOP AIR/H20 CONN 4PC DISP

## (undated) DEVICE — LN SMPL CO2 SHTRM SD STREAM W/M LUER

## (undated) DEVICE — BLCK/BITE BLOX W/DENTL/RIM W/STRAP 54F